# Patient Record
Sex: FEMALE | Race: WHITE | NOT HISPANIC OR LATINO | Employment: FULL TIME | URBAN - METROPOLITAN AREA
[De-identification: names, ages, dates, MRNs, and addresses within clinical notes are randomized per-mention and may not be internally consistent; named-entity substitution may affect disease eponyms.]

---

## 2017-07-09 PROBLEM — N13.30 HYDRONEPHROSIS: Status: ACTIVE | Noted: 2017-07-09

## 2017-07-09 PROBLEM — N12 PYELONEPHRITIS: Status: ACTIVE | Noted: 2017-07-09

## 2017-07-10 PROBLEM — Q62.39 UPJ OBSTRUCTION, CONGENITAL: Status: ACTIVE | Noted: 2017-07-10

## 2022-03-30 ENCOUNTER — TELEPHONE (OUTPATIENT)
Dept: UROLOGY | Facility: CLINIC | Age: 37
End: 2022-03-30
Payer: MEDICAID

## 2022-03-30 NOTE — TELEPHONE ENCOUNTER
----- Message from Jacquelin Coughlin MD sent at 3/30/2022 12:43 PM CDT -----  Of course, will have my staff make her an apt    Thanks,  Jacquelin  ----- Message -----  From: Ousmane Noyola MD  Sent: 3/30/2022  11:56 AM CDT  To: MD Dr. Madyson Jeff,    This patient has a upj obstruction on the right side. Presented with her second Dietl's crisis with possible ascending UTI. She required an urgent stent earlier this month. [place]. I cannot take her insurance outpatient non emergently. Can you please eval her in [place] for possible Pyeloplasty.     Imaging including Retrograde pyelograms are in Epic.       Feel free to call with in questions.    Ousmane Noyola  [phone] cell.

## 2022-03-30 NOTE — TELEPHONE ENCOUNTER
Called and spoke with patient, informed we received the referral and was calling to make an appt at the [facility]. The soonest appt I could make was 5/31. Informed message will be given to provider to to be seen sooner, will contact her back on Monday, patient verbally understood.

## 2022-04-04 ENCOUNTER — TELEPHONE (OUTPATIENT)
Dept: UROLOGY | Facility: CLINIC | Age: 37
End: 2022-04-04
Payer: MEDICAID

## 2022-04-04 NOTE — TELEPHONE ENCOUNTER
Called and spoke with patient, informed the provider is has moved the appt to sooner date of 4/19, patient accepted, appt made, patient verbally understood.

## 2022-04-04 NOTE — TELEPHONE ENCOUNTER
----- Message from Ju Ramsey sent at 4/4/2022  9:06 AM CDT -----  Regarding: Call Back  Who Called: pt          What is the reason for the call: pt is calling in regards to an appointment she has 5/31. Stated she was told may be able to be seen earlier. Please contact to further assist.          Can patient be contacted on [facility]: No         Call back number: [phone]

## 2022-04-05 ENCOUNTER — TELEPHONE (OUTPATIENT)
Dept: UROLOGY | Facility: CLINIC | Age: 37
End: 2022-04-05
Payer: MEDICAID

## 2022-04-05 NOTE — TELEPHONE ENCOUNTER
Returned call and spoke with patient, she states she is having some pain from the stent and thinks she may have UTI. Has new patient  appt to see provider at [facility], she can only be seen there. Provider unable to evaluate  without seeing patient. Advised to go to local ER for faster evaluation. Patient states she does not want to go there, she does not like their care, request to be place on wait list, which was already done. Patient verbally understood.

## 2022-04-05 NOTE — TELEPHONE ENCOUNTER
----- Message from Johanna Clayton, Patient Care Assistant sent at 4/5/2022  9:36 AM CDT -----  Regarding: advice  Contact: pt  Type: Needs Medical Advice  Who Called:  pt   Symptoms (please be specific):  UTI   How long has patient had these symptoms:  a week  Pharmacy name and phone #:    Best Call Back Number: [phone] (home)     Additional Information: please call pt to advise. Thanks!

## 2022-04-13 ENCOUNTER — TELEPHONE (OUTPATIENT)
Dept: UROLOGY | Facility: CLINIC | Age: 37
End: 2022-04-13
Payer: MEDICAID

## 2022-04-13 NOTE — TELEPHONE ENCOUNTER
Yes I do. However, we will have to discuss at her apt.   Only complicating factor is her [place] med[place] however, I am told that should not be a problem.

## 2022-04-13 NOTE — TELEPHONE ENCOUNTER
Spoke with patient, informed the provider does use the robot and they can discuss at the appt, patient verbally understood.

## 2022-04-13 NOTE — TELEPHONE ENCOUNTER
----- Message from Lance Zimmer sent at 4/13/2022  9:36 AM CDT -----  Regarding: medical questions  Contact: patient  Patient want to speak with a nurse regarding some medical questions, please call back at [phone]

## 2022-04-13 NOTE — TELEPHONE ENCOUNTER
Returned call and spoke with patient, she knows she has upcoming appt, but really wants to know does the provider do the pyelonephritis surgery using the robot.(having to do with recovery time ?) Informed will give message to provider and call her back with answer, patient verbally understood.

## 2022-04-18 NOTE — H&P (VIEW-ONLY)
[facility] Note    PCP: Primary Doctor No    Chief Complaint: right UPJ obstruction    SUBJECTIVE:       History of Present Illness:  Karie Rice is a 36 y.o. female who presents to clinic for right UPJ obstruction. She is New  to our clinic.     Patient presents to discuss right UPJo. She recently had a stent placed by Dr. Noyola for uncontrolled pain.   On review of her CT scan, she has severe right hydronephrosis with severe thinning of the right renal parenchyma.   She did have a MAG3 renal scan done in 2017 which showed 45% function of right kidney and no evidence of obstruction.     Had a stent 5 years ago which was removed and no further intervention after that as her MAG 3 was normal.     States she has had continued pain that is not relieved by the stent placement.   No issues with voiding.     UA today: small blood, trace leuks     Last urine culture: E coli (7/9/17)  Last creatinine: 1.15 (3/9/22)    Past medical, family, and social history reviewed as documented in chart with pertinent positive medical, family, and social history detailed in HPI.    Review of patient's allergies indicates:   Allergen Reactions    Codeine     Opioids - morphine analogues     Zithromax [azithromycin]        Past Medical History:   Diagnosis Date    Kidney stones     UPJ obstruction, acquired      Past Surgical History:   Procedure Laterality Date    CYSTOSCOPY      CYSTOURETEROSCOPY WITH RETROGRADE PYELOGRAPHY AND INSERTION OF STENT INTO URETER Right 03/10/2022    Procedure: CYSTOURETEROSCOPY, WITH RETROGRADE PYELOGRAM AND URETERAL STENT INSERTION;  Surgeon: Ousmane Noyola MD;  Location: [facility];  Service: Urology;  Laterality: Right;    LITHOTRIPSY       History reviewed. No pertinent family history.  Social History     Tobacco Use    Smoking status: Current Some Day Smoker    Smokeless tobacco: Never Used   Substance Use Topics    Alcohol use: No    Drug use: No        Review of Systems  "  Constitutional: Negative for fever.   Gastrointestinal: Positive for abdominal pain.   Genitourinary: Positive for flank pain. Negative for difficulty urinating, dysuria, frequency, hematuria, nocturia and urgency.       OBJECTIVE:     Anticoagulation: no    Estimated body mass index is 18.89 kg/m² as calculated from the following:    Height as of this encounter: 5' 7" (1.702 m).    Weight as of this encounter: 54.7 kg (120 lb 9.6 oz).    Vital Signs (Most Recent)  Pulse: 74 (04/19/22 0913)  Resp: 18 (04/19/22 0913)  BP: 109/72 (04/19/22 0913)    Physical Exam  Vitals reviewed.   Constitutional:       General: She is not in acute distress.     Appearance: Normal appearance. She is not ill-appearing.   HENT:      Head: Normocephalic and atraumatic.   Eyes:      General: No scleral icterus.  Cardiovascular:      Rate and Rhythm: Normal rate and regular rhythm.   Pulmonary:      Effort: Pulmonary effort is normal. No respiratory distress.   Abdominal:      Palpations: Abdomen is soft.   Skin:     Coloration: Skin is not jaundiced.   Neurological:      General: No focal deficit present.      Mental Status: She is alert and oriented to person, place, and time.   Psychiatric:         Mood and Affect: Mood normal.         Behavior: Behavior normal.         BMP  Lab Results   Component Value Date     03/09/2022    K 4.4 03/09/2022     03/09/2022    CO2 23 03/09/2022    BUN 15 03/09/2022    CREATININE 1.15 03/09/2022    CALCIUM 8.6 03/09/2022    ANIONGAP 9 03/09/2022    ESTGFRAFRICA >60 03/09/2022    EGFRNONAA >60 03/09/2022       Lab Results   Component Value Date    WBC 8.18 03/09/2022    HGB 11.8 (L) 03/09/2022    HCT 35.5 (L) 03/09/2022    MCV 86 03/09/2022     03/09/2022       Imaging:  Per HPI    ASSESSMENT     1. UPJ obstruction, acquired      PLAN:     - We reviewed her CT scan in detail   - Right kidney is severely atrophic and blow out. I suspect that it is not functional.   - Will repeat MAG 3 " scan and if confirms that kidney has <20 function we discussed need for a nephrectomy. If it does have some retained function we can go ahead with pyeloplasty  - Will also repeat CTRSS to see if kidney has decompressed with the stent in place   - She is anxious to have some thing done as she is in severe discomfort and has been dealing with this for a while. She states that she would favor nephrectomy as she does not want to have to deal with this anymore   - Will call her with results of scans   - Booked for robotic right nephrectomy on 5/5, can change to pyeloplasty if needed   - The patient is scheduled for a right nephrectomy, possible open. The risks and benefits of nephrectomy were discussed with the patient in detail. The risks include but are not limited to bleeding possibly requiring a blood transfusion, infection, pain, injury to internal organs such as lung, liver, spleen, bowel, adrenal, incomplete removal of tumor if present. Risks may vary depending on reasons for removal of kidney, persistent flank pain or hernia at area of incision, urinary infection requiring antibiotics, air embolus, pulmonary embolus and possible conversion to an open surgery. Patient understands these risks and has agreed to proceed with surgery.   - Urine today sent for culture       Jacquelin Coughlin MD     Letter to Ousmane Noyola MD

## 2022-04-18 NOTE — PROGRESS NOTES
[facility] Note    PCP: Primary Doctor No    Chief Complaint: right UPJ obstruction    SUBJECTIVE:       History of Present Illness:  Karie Rice is a 36 y.o. female who presents to clinic for right UPJ obstruction. She is New  to our clinic.     Patient presents to discuss right UPJo. She recently had a stent placed by Dr. Noyola for uncontrolled pain.   On review of her CT scan, she has severe right hydronephrosis with severe thinning of the right renal parenchyma.   She did have a MAG3 renal scan done in 2017 which showed 45% function of right kidney and no evidence of obstruction.     Had a stent 5 years ago which was removed and no further intervention after that as her MAG 3 was normal.     States she has had continued pain that is not relieved by the stent placement.   No issues with voiding.     UA today: small blood, trace leuks     Last urine culture: E coli (7/9/17)  Last creatinine: 1.15 (3/9/22)    Past medical, family, and social history reviewed as documented in chart with pertinent positive medical, family, and social history detailed in HPI.    Review of patient's allergies indicates:   Allergen Reactions    Codeine     Opioids - morphine analogues     Zithromax [azithromycin]        Past Medical History:   Diagnosis Date    Kidney stones     UPJ obstruction, acquired      Past Surgical History:   Procedure Laterality Date    CYSTOSCOPY      CYSTOURETEROSCOPY WITH RETROGRADE PYELOGRAPHY AND INSERTION OF STENT INTO URETER Right 03/10/2022    Procedure: CYSTOURETEROSCOPY, WITH RETROGRADE PYELOGRAM AND URETERAL STENT INSERTION;  Surgeon: Ousmane Noyola MD;  Location: [facility];  Service: Urology;  Laterality: Right;    LITHOTRIPSY       History reviewed. No pertinent family history.  Social History     Tobacco Use    Smoking status: Current Some Day Smoker    Smokeless tobacco: Never Used   Substance Use Topics    Alcohol use: No    Drug use: No        Review of Systems  "  Constitutional: Negative for fever.   Gastrointestinal: Positive for abdominal pain.   Genitourinary: Positive for flank pain. Negative for difficulty urinating, dysuria, frequency, hematuria, nocturia and urgency.       OBJECTIVE:     Anticoagulation: no    Estimated body mass index is 18.89 kg/m² as calculated from the following:    Height as of this encounter: 5' 7" (1.702 m).    Weight as of this encounter: 54.7 kg (120 lb 9.6 oz).    Vital Signs (Most Recent)  Pulse: 74 (04/19/22 0913)  Resp: 18 (04/19/22 0913)  BP: 109/72 (04/19/22 0913)    Physical Exam  Vitals reviewed.   Constitutional:       General: She is not in acute distress.     Appearance: Normal appearance. She is not ill-appearing.   HENT:      Head: Normocephalic and atraumatic.   Eyes:      General: No scleral icterus.  Cardiovascular:      Rate and Rhythm: Normal rate and regular rhythm.   Pulmonary:      Effort: Pulmonary effort is normal. No respiratory distress.   Abdominal:      Palpations: Abdomen is soft.   Skin:     Coloration: Skin is not jaundiced.   Neurological:      General: No focal deficit present.      Mental Status: She is alert and oriented to person, place, and time.   Psychiatric:         Mood and Affect: Mood normal.         Behavior: Behavior normal.         BMP  Lab Results   Component Value Date     03/09/2022    K 4.4 03/09/2022     03/09/2022    CO2 23 03/09/2022    BUN 15 03/09/2022    CREATININE 1.15 03/09/2022    CALCIUM 8.6 03/09/2022    ANIONGAP 9 03/09/2022    ESTGFRAFRICA >60 03/09/2022    EGFRNONAA >60 03/09/2022       Lab Results   Component Value Date    WBC 8.18 03/09/2022    HGB 11.8 (L) 03/09/2022    HCT 35.5 (L) 03/09/2022    MCV 86 03/09/2022     03/09/2022       Imaging:  Per HPI    ASSESSMENT     1. UPJ obstruction, acquired      PLAN:     - We reviewed her CT scan in detail   - Right kidney is severely atrophic and blow out. I suspect that it is not functional.   - Will repeat MAG 3 " scan and if confirms that kidney has <20 function we discussed need for a nephrectomy. If it does have some retained function we can go ahead with pyeloplasty  - Will also repeat CTRSS to see if kidney has decompressed with the stent in place   - She is anxious to have some thing done as she is in severe discomfort and has been dealing with this for a while. She states that she would favor nephrectomy as she does not want to have to deal with this anymore   - Will call her with results of scans   - Booked for robotic right nephrectomy on 5/5, can change to pyeloplasty if needed   - The patient is scheduled for a right nephrectomy, possible open. The risks and benefits of nephrectomy were discussed with the patient in detail. The risks include but are not limited to bleeding possibly requiring a blood transfusion, infection, pain, injury to internal organs such as lung, liver, spleen, bowel, adrenal, incomplete removal of tumor if present. Risks may vary depending on reasons for removal of kidney, persistent flank pain or hernia at area of incision, urinary infection requiring antibiotics, air embolus, pulmonary embolus and possible conversion to an open surgery. Patient understands these risks and has agreed to proceed with surgery.   - Urine today sent for culture       Jacquelin Coughlin MD     Letter to Ousmane Noyola MD

## 2022-04-19 ENCOUNTER — TELEPHONE (OUTPATIENT)
Dept: UROLOGY | Facility: CLINIC | Age: 37
End: 2022-04-19
Payer: MEDICAID

## 2022-04-19 ENCOUNTER — OFFICE VISIT (OUTPATIENT)
Dept: UROLOGY | Facility: CLINIC | Age: 37
End: 2022-04-19
Payer: MEDICAID

## 2022-04-19 VITALS
HEART RATE: 74 BPM | SYSTOLIC BLOOD PRESSURE: 109 MMHG | RESPIRATION RATE: 18 BRPM | HEIGHT: 67 IN | DIASTOLIC BLOOD PRESSURE: 72 MMHG | WEIGHT: 120.63 LBS | BODY MASS INDEX: 18.93 KG/M2

## 2022-04-19 DIAGNOSIS — N13.5 UPJ OBSTRUCTION, ACQUIRED: Primary | ICD-10-CM

## 2022-04-19 LAB
BILIRUB SERPL-MCNC: ABNORMAL MG/DL
BLOOD URINE, POC: ABNORMAL
CLARITY, POC UA: CLEAR
COLOR, POC UA: YELLOW
GLUCOSE UR QL STRIP: ABNORMAL
KETONES UR QL STRIP: ABNORMAL
LEUKOCYTE ESTERASE URINE, POC: ABNORMAL
NITRITE, POC UA: ABNORMAL
PH, POC UA: 6
PROTEIN, POC: ABNORMAL
SPECIFIC GRAVITY, POC UA: 1.01
UROBILINOGEN, POC UA: 0.2

## 2022-04-19 PROCEDURE — 99999 PR PBB SHADOW E&M-EST. PATIENT-LVL IV: CPT | Mod: PBBFAC,,, | Performed by: STUDENT IN AN ORGANIZED HEALTH CARE EDUCATION/TRAINING PROGRAM

## 2022-04-19 PROCEDURE — 3074F SYST BP LT 130 MM HG: CPT | Mod: CPTII,,, | Performed by: STUDENT IN AN ORGANIZED HEALTH CARE EDUCATION/TRAINING PROGRAM

## 2022-04-19 PROCEDURE — 87086 URINE CULTURE/COLONY COUNT: CPT | Performed by: STUDENT IN AN ORGANIZED HEALTH CARE EDUCATION/TRAINING PROGRAM

## 2022-04-19 PROCEDURE — 3008F PR BODY MASS INDEX (BMI) DOCUMENTED: ICD-10-PCS | Mod: CPTII,,, | Performed by: STUDENT IN AN ORGANIZED HEALTH CARE EDUCATION/TRAINING PROGRAM

## 2022-04-19 PROCEDURE — 99204 PR OFFICE/OUTPT VISIT, NEW, LEVL IV, 45-59 MIN: ICD-10-PCS | Mod: S$PBB,,, | Performed by: STUDENT IN AN ORGANIZED HEALTH CARE EDUCATION/TRAINING PROGRAM

## 2022-04-19 PROCEDURE — 3074F PR MOST RECENT SYSTOLIC BLOOD PRESSURE < 130 MM HG: ICD-10-PCS | Mod: CPTII,,, | Performed by: STUDENT IN AN ORGANIZED HEALTH CARE EDUCATION/TRAINING PROGRAM

## 2022-04-19 PROCEDURE — 81002 URINALYSIS NONAUTO W/O SCOPE: CPT | Mod: PBBFAC | Performed by: STUDENT IN AN ORGANIZED HEALTH CARE EDUCATION/TRAINING PROGRAM

## 2022-04-19 PROCEDURE — 3078F DIAST BP <80 MM HG: CPT | Mod: CPTII,,, | Performed by: STUDENT IN AN ORGANIZED HEALTH CARE EDUCATION/TRAINING PROGRAM

## 2022-04-19 PROCEDURE — 1159F PR MEDICATION LIST DOCUMENTED IN MEDICAL RECORD: ICD-10-PCS | Mod: CPTII,,, | Performed by: STUDENT IN AN ORGANIZED HEALTH CARE EDUCATION/TRAINING PROGRAM

## 2022-04-19 PROCEDURE — 99999 PR PBB SHADOW E&M-EST. PATIENT-LVL IV: ICD-10-PCS | Mod: PBBFAC,,, | Performed by: STUDENT IN AN ORGANIZED HEALTH CARE EDUCATION/TRAINING PROGRAM

## 2022-04-19 PROCEDURE — 3008F BODY MASS INDEX DOCD: CPT | Mod: CPTII,,, | Performed by: STUDENT IN AN ORGANIZED HEALTH CARE EDUCATION/TRAINING PROGRAM

## 2022-04-19 PROCEDURE — 99214 OFFICE O/P EST MOD 30 MIN: CPT | Mod: PBBFAC | Performed by: STUDENT IN AN ORGANIZED HEALTH CARE EDUCATION/TRAINING PROGRAM

## 2022-04-19 PROCEDURE — 1159F MED LIST DOCD IN RCRD: CPT | Mod: CPTII,,, | Performed by: STUDENT IN AN ORGANIZED HEALTH CARE EDUCATION/TRAINING PROGRAM

## 2022-04-19 PROCEDURE — 3078F PR MOST RECENT DIASTOLIC BLOOD PRESSURE < 80 MM HG: ICD-10-PCS | Mod: CPTII,,, | Performed by: STUDENT IN AN ORGANIZED HEALTH CARE EDUCATION/TRAINING PROGRAM

## 2022-04-19 PROCEDURE — 99204 OFFICE O/P NEW MOD 45 MIN: CPT | Mod: S$PBB,,, | Performed by: STUDENT IN AN ORGANIZED HEALTH CARE EDUCATION/TRAINING PROGRAM

## 2022-04-19 NOTE — TELEPHONE ENCOUNTER
----- Message from Gage Diehl MA sent at 4/19/2022 10:45 AM CDT -----  Contact: EDNA LEWIS [[id number]]  Type: Needs Medical Advice    Who Called:EDNA LEWIS [[id number]]  Best Call Back Number: [phone]  Inquiry/Question: Would you kindly call EDNA LEWIS [[id number]] regarding upcoming appt  Thank you~

## 2022-04-19 NOTE — TELEPHONE ENCOUNTER
Returned call and spoke with patient, she request to speak with Nikki, informed message will be given to her to call her back, patient verbally understood.

## 2022-04-20 LAB — BACTERIA UR CULT: NO GROWTH

## 2022-04-26 ENCOUNTER — HOSPITAL ENCOUNTER (OUTPATIENT)
Dept: RADIOLOGY | Facility: HOSPITAL | Age: 37
Discharge: HOME | End: 2022-04-26
Attending: STUDENT IN AN ORGANIZED HEALTH CARE EDUCATION/TRAINING PROGRAM
Payer: MEDICAID

## 2022-04-26 ENCOUNTER — TELEPHONE (OUTPATIENT)
Dept: UROLOGY | Facility: CLINIC | Age: 37
End: 2022-04-26
Payer: MEDICAID

## 2022-04-26 VITALS — SYSTOLIC BLOOD PRESSURE: 117 MMHG | DIASTOLIC BLOOD PRESSURE: 71 MMHG

## 2022-04-26 DIAGNOSIS — N13.5 UPJ OBSTRUCTION, ACQUIRED: Primary | ICD-10-CM

## 2022-04-26 DIAGNOSIS — N13.5 UPJ OBSTRUCTION, ACQUIRED: ICD-10-CM

## 2022-04-26 PROCEDURE — 74176 CT ABD & PELVIS W/O CONTRAST: CPT | Mod: 26,,, | Performed by: RADIOLOGY

## 2022-04-26 PROCEDURE — 78708 K FLOW/FUNCT IMAGE W/DRUG: CPT | Mod: TC

## 2022-04-26 PROCEDURE — 78708 K FLOW/FUNCT IMAGE W/DRUG: CPT | Mod: 26,,, | Performed by: RADIOLOGY

## 2022-04-26 PROCEDURE — 78708 NM KIDNEY W FLOW AND FUNCTION W PHARMACOLOGICAL: ICD-10-PCS | Mod: 26,,, | Performed by: RADIOLOGY

## 2022-04-26 PROCEDURE — 63600175 PHARM REV CODE 636 W HCPCS

## 2022-04-26 PROCEDURE — 74176 CT ABD & PELVIS W/O CONTRAST: CPT | Mod: TC

## 2022-04-26 PROCEDURE — 74176 CT RENAL STONE STUDY ABD PELVIS WO: ICD-10-PCS | Mod: 26,,, | Performed by: RADIOLOGY

## 2022-04-26 RX ORDER — FUROSEMIDE 10 MG/ML
INJECTION INTRAMUSCULAR; INTRAVENOUS
Status: COMPLETED
Start: 2022-04-26 | End: 2022-04-26

## 2022-04-26 RX ADMIN — FUROSEMIDE: 10 INJECTION, SOLUTION INTRAMUSCULAR; INTRAVENOUS at 01:04

## 2022-04-26 NOTE — TELEPHONE ENCOUNTER
----- Message from Jacquelin Coughlin MD sent at 4/26/2022  3:23 PM CDT -----  Results discussed with patient. 16.5% function of right kidney. Resolution of hydro with stent in place. Recommended we try to preserve kidney function with pyeloplasty. Risks and benefits discussed. She has agreed to proceed. Urine culture today. Scheduled for surgery 5/5.

## 2022-04-26 NOTE — TELEPHONE ENCOUNTER
Spoke with patient, inquired about covid vaccine, was indeed vaccinated, dates given, Moderna given and the booster. Unable to edit the 2nd and booster for moderna.

## 2022-05-02 RX ORDER — BUSPIRONE HYDROCHLORIDE 5 MG/1
5 TABLET ORAL DAILY
COMMUNITY
End: 2024-01-17

## 2022-05-02 RX ORDER — FLUOXETINE HYDROCHLORIDE 20 MG/1
20 CAPSULE ORAL DAILY
COMMUNITY
End: 2024-01-17

## 2022-05-04 ENCOUNTER — PATIENT MESSAGE (OUTPATIENT)
Dept: SURGERY | Facility: HOSPITAL | Age: 37
End: 2022-05-04
Payer: MEDICAID

## 2022-05-04 ENCOUNTER — ANESTHESIA EVENT (OUTPATIENT)
Dept: SURGERY | Facility: HOSPITAL | Age: 37
End: 2022-05-04
Payer: MEDICAID

## 2022-05-05 ENCOUNTER — ANESTHESIA (OUTPATIENT)
Dept: SURGERY | Facility: HOSPITAL | Age: 37
End: 2022-05-05
Payer: MEDICAID

## 2022-05-05 ENCOUNTER — HOSPITAL ENCOUNTER (INPATIENT)
Facility: HOSPITAL | Age: 37
LOS: 1 days | Discharge: HOME | End: 2022-05-06
Attending: STUDENT IN AN ORGANIZED HEALTH CARE EDUCATION/TRAINING PROGRAM | Admitting: STUDENT IN AN ORGANIZED HEALTH CARE EDUCATION/TRAINING PROGRAM
Payer: MEDICAID

## 2022-05-05 DIAGNOSIS — Q62.39 UPJ OBSTRUCTION, CONGENITAL: Primary | ICD-10-CM

## 2022-05-05 DIAGNOSIS — N13.5 OBSTRUCTION OF RIGHT URETEROPELVIC JUNCTION (UPJ): ICD-10-CM

## 2022-05-05 LAB
ABO + RH BLD: NORMAL
B-HCG UR QL: NEGATIVE
BLD GP AB SCN CELLS X3 SERPL QL: NORMAL
CTP QC/QA: YES

## 2022-05-05 PROCEDURE — 94799 UNLISTED PULMONARY SVC/PX: CPT

## 2022-05-05 PROCEDURE — 25000003 PHARM REV CODE 250: Performed by: ANESTHESIOLOGY

## 2022-05-05 PROCEDURE — 99900103 DSU ONLY-NO CHARGE-INITIAL HR (STAT): Performed by: STUDENT IN AN ORGANIZED HEALTH CARE EDUCATION/TRAINING PROGRAM

## 2022-05-05 PROCEDURE — 25000003 PHARM REV CODE 250: Performed by: STUDENT IN AN ORGANIZED HEALTH CARE EDUCATION/TRAINING PROGRAM

## 2022-05-05 PROCEDURE — 25000003 PHARM REV CODE 250: Performed by: NURSE ANESTHETIST, CERTIFIED REGISTERED

## 2022-05-05 PROCEDURE — 52332 CYSTOSCOPY AND TREATMENT: CPT | Mod: 51,RT,, | Performed by: STUDENT IN AN ORGANIZED HEALTH CARE EDUCATION/TRAINING PROGRAM

## 2022-05-05 PROCEDURE — 12000002 HC ACUTE/MED SURGE SEMI-PRIVATE ROOM

## 2022-05-05 PROCEDURE — C2617 STENT, NON-COR, TEM W/O DEL: HCPCS | Performed by: STUDENT IN AN ORGANIZED HEALTH CARE EDUCATION/TRAINING PROGRAM

## 2022-05-05 PROCEDURE — 37000008 HC ANESTHESIA 1ST 15 MINUTES: Performed by: STUDENT IN AN ORGANIZED HEALTH CARE EDUCATION/TRAINING PROGRAM

## 2022-05-05 PROCEDURE — 36000713 HC OR TIME LEV V EA ADD 15 MIN: Performed by: STUDENT IN AN ORGANIZED HEALTH CARE EDUCATION/TRAINING PROGRAM

## 2022-05-05 PROCEDURE — 52332 PR CYSTOSCOPY,INSERT URETERAL STENT: ICD-10-PCS | Mod: 51,RT,, | Performed by: STUDENT IN AN ORGANIZED HEALTH CARE EDUCATION/TRAINING PROGRAM

## 2022-05-05 PROCEDURE — D9220A PRA ANESTHESIA: ICD-10-PCS | Mod: ANES,,, | Performed by: ANESTHESIOLOGY

## 2022-05-05 PROCEDURE — 37000009 HC ANESTHESIA EA ADD 15 MINS: Performed by: STUDENT IN AN ORGANIZED HEALTH CARE EDUCATION/TRAINING PROGRAM

## 2022-05-05 PROCEDURE — 71000033 HC RECOVERY, INTIAL HOUR: Performed by: STUDENT IN AN ORGANIZED HEALTH CARE EDUCATION/TRAINING PROGRAM

## 2022-05-05 PROCEDURE — 86901 BLOOD TYPING SEROLOGIC RH(D): CPT | Performed by: STUDENT IN AN ORGANIZED HEALTH CARE EDUCATION/TRAINING PROGRAM

## 2022-05-05 PROCEDURE — C1729 CATH, DRAINAGE: HCPCS | Performed by: STUDENT IN AN ORGANIZED HEALTH CARE EDUCATION/TRAINING PROGRAM

## 2022-05-05 PROCEDURE — 94760 N-INVAS EAR/PLS OXIMETRY 1: CPT

## 2022-05-05 PROCEDURE — 63600175 PHARM REV CODE 636 W HCPCS: Performed by: NURSE ANESTHETIST, CERTIFIED REGISTERED

## 2022-05-05 PROCEDURE — 71000039 HC RECOVERY, EACH ADD'L HOUR: Performed by: STUDENT IN AN ORGANIZED HEALTH CARE EDUCATION/TRAINING PROGRAM

## 2022-05-05 PROCEDURE — 63600175 PHARM REV CODE 636 W HCPCS: Performed by: ANESTHESIOLOGY

## 2022-05-05 PROCEDURE — 81025 URINE PREGNANCY TEST: CPT | Performed by: ANESTHESIOLOGY

## 2022-05-05 PROCEDURE — 50544 PR LAP,PYELOPLASTY: ICD-10-PCS | Mod: RT,,, | Performed by: STUDENT IN AN ORGANIZED HEALTH CARE EDUCATION/TRAINING PROGRAM

## 2022-05-05 PROCEDURE — 50544 LAPAROSCOPY PYELOPLASTY: CPT | Mod: RT,,, | Performed by: STUDENT IN AN ORGANIZED HEALTH CARE EDUCATION/TRAINING PROGRAM

## 2022-05-05 PROCEDURE — D9220A PRA ANESTHESIA: Mod: ANES,,, | Performed by: ANESTHESIOLOGY

## 2022-05-05 PROCEDURE — 99900104 DSU ONLY-NO CHARGE-EA ADD'L HR (STAT): Performed by: STUDENT IN AN ORGANIZED HEALTH CARE EDUCATION/TRAINING PROGRAM

## 2022-05-05 PROCEDURE — C1769 GUIDE WIRE: HCPCS | Performed by: STUDENT IN AN ORGANIZED HEALTH CARE EDUCATION/TRAINING PROGRAM

## 2022-05-05 PROCEDURE — 36000712 HC OR TIME LEV V 1ST 15 MIN: Performed by: STUDENT IN AN ORGANIZED HEALTH CARE EDUCATION/TRAINING PROGRAM

## 2022-05-05 PROCEDURE — 63600175 PHARM REV CODE 636 W HCPCS: Performed by: STUDENT IN AN ORGANIZED HEALTH CARE EDUCATION/TRAINING PROGRAM

## 2022-05-05 PROCEDURE — 27201423 OPTIME MED/SURG SUP & DEVICES STERILE SUPPLY: Performed by: STUDENT IN AN ORGANIZED HEALTH CARE EDUCATION/TRAINING PROGRAM

## 2022-05-05 PROCEDURE — 36415 COLL VENOUS BLD VENIPUNCTURE: CPT | Performed by: STUDENT IN AN ORGANIZED HEALTH CARE EDUCATION/TRAINING PROGRAM

## 2022-05-05 PROCEDURE — D9220A PRA ANESTHESIA: Mod: CRNA,,, | Performed by: NURSE ANESTHETIST, CERTIFIED REGISTERED

## 2022-05-05 PROCEDURE — D9220A PRA ANESTHESIA: ICD-10-PCS | Mod: CRNA,,, | Performed by: NURSE ANESTHETIST, CERTIFIED REGISTERED

## 2022-05-05 DEVICE — STENT SET URETERAL 6X26CM: Type: IMPLANTABLE DEVICE | Site: URETER | Status: FUNCTIONAL

## 2022-05-05 RX ORDER — BUPIVACAINE HYDROCHLORIDE 5 MG/ML
INJECTION, SOLUTION EPIDURAL; INTRACAUDAL
Status: DISCONTINUED | OUTPATIENT
Start: 2022-05-05 | End: 2022-05-05

## 2022-05-05 RX ORDER — OXYCODONE HYDROCHLORIDE 5 MG/1
5 TABLET ORAL EVERY 4 HOURS PRN
Status: DISCONTINUED | OUTPATIENT
Start: 2022-05-05 | End: 2022-05-06 | Stop reason: HOSPADM

## 2022-05-05 RX ORDER — NEOSTIGMINE METHYLSULFATE 1 MG/ML
INJECTION, SOLUTION INTRAVENOUS
Status: DISCONTINUED | OUTPATIENT
Start: 2022-05-05 | End: 2022-05-05

## 2022-05-05 RX ORDER — ACETAMINOPHEN 10 MG/ML
INJECTION, SOLUTION INTRAVENOUS
Status: DISCONTINUED | OUTPATIENT
Start: 2022-05-05 | End: 2022-05-05

## 2022-05-05 RX ORDER — ONDANSETRON HYDROCHLORIDE 2 MG/ML
INJECTION, SOLUTION INTRAMUSCULAR; INTRAVENOUS
Status: DISCONTINUED | OUTPATIENT
Start: 2022-05-05 | End: 2022-05-05

## 2022-05-05 RX ORDER — PROPOFOL 10 MG/ML
VIAL (ML) INTRAVENOUS
Status: DISCONTINUED | OUTPATIENT
Start: 2022-05-05 | End: 2022-05-05

## 2022-05-05 RX ORDER — PHENYLEPHRINE HYDROCHLORIDE 10 MG/ML
INJECTION INTRAVENOUS
Status: DISCONTINUED | OUTPATIENT
Start: 2022-05-05 | End: 2022-05-05

## 2022-05-05 RX ORDER — POLYETHYLENE GLYCOL 3350 17 G/17G
17 POWDER, FOR SOLUTION ORAL DAILY
Status: DISCONTINUED | OUTPATIENT
Start: 2022-05-05 | End: 2022-05-06 | Stop reason: HOSPADM

## 2022-05-05 RX ORDER — SODIUM CHLORIDE 9 MG/ML
INJECTION, SOLUTION INTRAVENOUS CONTINUOUS
Status: DISCONTINUED | OUTPATIENT
Start: 2022-05-05 | End: 2022-05-06 | Stop reason: HOSPADM

## 2022-05-05 RX ORDER — ACETAMINOPHEN 500 MG
1000 TABLET ORAL EVERY 8 HOURS
Status: DISCONTINUED | OUTPATIENT
Start: 2022-05-05 | End: 2022-05-06 | Stop reason: HOSPADM

## 2022-05-05 RX ORDER — METOCLOPRAMIDE HYDROCHLORIDE 5 MG/ML
10 INJECTION INTRAMUSCULAR; INTRAVENOUS EVERY 10 MIN PRN
Status: DISCONTINUED | OUTPATIENT
Start: 2022-05-05 | End: 2022-05-05 | Stop reason: HOSPADM

## 2022-05-05 RX ORDER — MUPIROCIN 20 MG/G
OINTMENT TOPICAL 2 TIMES DAILY
Status: DISCONTINUED | OUTPATIENT
Start: 2022-05-05 | End: 2022-05-06 | Stop reason: HOSPADM

## 2022-05-05 RX ORDER — SODIUM CHLORIDE 0.9 % (FLUSH) 0.9 %
10 SYRINGE (ML) INJECTION
Status: DISCONTINUED | OUTPATIENT
Start: 2022-05-05 | End: 2022-05-06 | Stop reason: HOSPADM

## 2022-05-05 RX ORDER — MIDAZOLAM HYDROCHLORIDE 1 MG/ML
2 INJECTION, SOLUTION INTRAMUSCULAR; INTRAVENOUS ONCE
Status: COMPLETED | OUTPATIENT
Start: 2022-05-05 | End: 2022-05-05

## 2022-05-05 RX ORDER — MIDAZOLAM HYDROCHLORIDE 1 MG/ML
INJECTION INTRAMUSCULAR; INTRAVENOUS
Status: DISCONTINUED | OUTPATIENT
Start: 2022-05-05 | End: 2022-05-05

## 2022-05-05 RX ORDER — HYDROMORPHONE HYDROCHLORIDE 2 MG/ML
0.2 INJECTION, SOLUTION INTRAMUSCULAR; INTRAVENOUS; SUBCUTANEOUS EVERY 5 MIN PRN
Status: DISCONTINUED | OUTPATIENT
Start: 2022-05-05 | End: 2022-05-05 | Stop reason: HOSPADM

## 2022-05-05 RX ORDER — LIDOCAINE HCL/PF 100 MG/5ML
SYRINGE (ML) INTRAVENOUS
Status: DISCONTINUED | OUTPATIENT
Start: 2022-05-05 | End: 2022-05-05

## 2022-05-05 RX ORDER — CEFAZOLIN SODIUM 2 G/50ML
2 SOLUTION INTRAVENOUS
Status: COMPLETED | OUTPATIENT
Start: 2022-05-05 | End: 2022-05-05

## 2022-05-05 RX ORDER — FENTANYL CITRATE 50 UG/ML
25 INJECTION, SOLUTION INTRAMUSCULAR; INTRAVENOUS EVERY 5 MIN PRN
Status: DISCONTINUED | OUTPATIENT
Start: 2022-05-05 | End: 2022-05-05 | Stop reason: HOSPADM

## 2022-05-05 RX ORDER — PROMETHAZINE HYDROCHLORIDE 12.5 MG/1
12.5 TABLET ORAL EVERY 6 HOURS PRN
Status: DISCONTINUED | OUTPATIENT
Start: 2022-05-05 | End: 2022-05-06 | Stop reason: HOSPADM

## 2022-05-05 RX ORDER — FLUOXETINE HYDROCHLORIDE 20 MG/1
20 CAPSULE ORAL DAILY
Status: DISCONTINUED | OUTPATIENT
Start: 2022-05-05 | End: 2022-05-06 | Stop reason: HOSPADM

## 2022-05-05 RX ORDER — FENTANYL CITRATE 50 UG/ML
INJECTION, SOLUTION INTRAMUSCULAR; INTRAVENOUS
Status: DISCONTINUED | OUTPATIENT
Start: 2022-05-05 | End: 2022-05-05

## 2022-05-05 RX ORDER — DEXAMETHASONE SODIUM PHOSPHATE 4 MG/ML
INJECTION, SOLUTION INTRA-ARTICULAR; INTRALESIONAL; INTRAMUSCULAR; INTRAVENOUS; SOFT TISSUE
Status: DISCONTINUED | OUTPATIENT
Start: 2022-05-05 | End: 2022-05-05

## 2022-05-05 RX ORDER — BUSPIRONE HYDROCHLORIDE 5 MG/1
5 TABLET ORAL DAILY
Status: DISCONTINUED | OUTPATIENT
Start: 2022-05-05 | End: 2022-05-06 | Stop reason: HOSPADM

## 2022-05-05 RX ORDER — PANTOPRAZOLE SODIUM 40 MG/1
40 TABLET, DELAYED RELEASE ORAL DAILY
Status: DISCONTINUED | OUTPATIENT
Start: 2022-05-05 | End: 2022-05-06 | Stop reason: HOSPADM

## 2022-05-05 RX ORDER — ONDANSETRON 8 MG/1
8 TABLET, ORALLY DISINTEGRATING ORAL EVERY 6 HOURS PRN
Status: DISCONTINUED | OUTPATIENT
Start: 2022-05-05 | End: 2022-05-06 | Stop reason: HOSPADM

## 2022-05-05 RX ORDER — OXYCODONE HYDROCHLORIDE 5 MG/1
5 TABLET ORAL
Status: DISCONTINUED | OUTPATIENT
Start: 2022-05-05 | End: 2022-05-05 | Stop reason: HOSPADM

## 2022-05-05 RX ORDER — OXYCODONE HYDROCHLORIDE 10 MG/1
10 TABLET ORAL EVERY 4 HOURS PRN
Status: DISCONTINUED | OUTPATIENT
Start: 2022-05-05 | End: 2022-05-06 | Stop reason: HOSPADM

## 2022-05-05 RX ORDER — LIDOCAINE HYDROCHLORIDE 10 MG/ML
1 INJECTION, SOLUTION EPIDURAL; INFILTRATION; INTRACAUDAL; PERINEURAL ONCE
Status: DISCONTINUED | OUTPATIENT
Start: 2022-05-05 | End: 2022-05-05

## 2022-05-05 RX ORDER — ROCURONIUM BROMIDE 10 MG/ML
INJECTION, SOLUTION INTRAVENOUS
Status: DISCONTINUED | OUTPATIENT
Start: 2022-05-05 | End: 2022-05-05

## 2022-05-05 RX ADMIN — DEXAMETHASONE SODIUM PHOSPHATE 4 MG: 4 INJECTION, SOLUTION INTRA-ARTICULAR; INTRALESIONAL; INTRAMUSCULAR; INTRAVENOUS; SOFT TISSUE at 10:05

## 2022-05-05 RX ADMIN — NEOSTIGMINE METHYLSULFATE 3 MG: 1 INJECTION INTRAVENOUS at 01:05

## 2022-05-05 RX ADMIN — LIDOCAINE HYDROCHLORIDE 50 MG: 20 INJECTION INTRAVENOUS at 10:05

## 2022-05-05 RX ADMIN — SODIUM CHLORIDE, SODIUM GLUCONATE, SODIUM ACETATE, POTASSIUM CHLORIDE, MAGNESIUM CHLORIDE, SODIUM PHOSPHATE, DIBASIC, AND POTASSIUM PHOSPHATE: .53; .5; .37; .037; .03; .012; .00082 INJECTION, SOLUTION INTRAVENOUS at 07:05

## 2022-05-05 RX ADMIN — ROCURONIUM BROMIDE 10 MG: 10 INJECTION, SOLUTION INTRAVENOUS at 12:05

## 2022-05-05 RX ADMIN — SODIUM CHLORIDE, SODIUM GLUCONATE, SODIUM ACETATE, POTASSIUM CHLORIDE, MAGNESIUM CHLORIDE, SODIUM PHOSPHATE, DIBASIC, AND POTASSIUM PHOSPHATE: .53; .5; .37; .037; .03; .012; .00082 INJECTION, SOLUTION INTRAVENOUS at 11:05

## 2022-05-05 RX ADMIN — HYDROMORPHONE HYDROCHLORIDE 0.2 MG: 2 INJECTION INTRAMUSCULAR; INTRAVENOUS; SUBCUTANEOUS at 03:05

## 2022-05-05 RX ADMIN — ROCURONIUM BROMIDE 40 MG: 10 INJECTION, SOLUTION INTRAVENOUS at 10:05

## 2022-05-05 RX ADMIN — PHENYLEPHRINE HYDROCHLORIDE 0.61 MCG/KG/MIN: 10 INJECTION INTRAVENOUS at 10:05

## 2022-05-05 RX ADMIN — ONDANSETRON 4 MG: 2 INJECTION, SOLUTION INTRAMUSCULAR; INTRAVENOUS at 10:05

## 2022-05-05 RX ADMIN — SODIUM CHLORIDE: 0.9 INJECTION, SOLUTION INTRAVENOUS at 03:05

## 2022-05-05 RX ADMIN — FENTANYL CITRATE 50 MCG: 50 INJECTION, SOLUTION INTRAMUSCULAR; INTRAVENOUS at 10:05

## 2022-05-05 RX ADMIN — ONDANSETRON 8 MG: 8 TABLET, ORALLY DISINTEGRATING ORAL at 04:05

## 2022-05-05 RX ADMIN — MIDAZOLAM HYDROCHLORIDE 2 MG: 1 INJECTION, SOLUTION INTRAMUSCULAR; INTRAVENOUS at 10:05

## 2022-05-05 RX ADMIN — PHENYLEPHRINE HYDROCHLORIDE 200 MCG: 10 INJECTION INTRAVENOUS at 01:05

## 2022-05-05 RX ADMIN — PHENYLEPHRINE HYDROCHLORIDE 100 MCG: 10 INJECTION INTRAVENOUS at 11:05

## 2022-05-05 RX ADMIN — ONDANSETRON 4 MG: 2 INJECTION, SOLUTION INTRAMUSCULAR; INTRAVENOUS at 01:05

## 2022-05-05 RX ADMIN — OXYCODONE 5 MG: 5 TABLET ORAL at 04:05

## 2022-05-05 RX ADMIN — PHENYLEPHRINE HYDROCHLORIDE 200 MCG: 10 INJECTION INTRAVENOUS at 10:05

## 2022-05-05 RX ADMIN — MIDAZOLAM 1 MG: 1 INJECTION INTRAMUSCULAR; INTRAVENOUS at 08:05

## 2022-05-05 RX ADMIN — ROCURONIUM BROMIDE 10 MG: 10 INJECTION, SOLUTION INTRAVENOUS at 11:05

## 2022-05-05 RX ADMIN — MUPIROCIN: 20 OINTMENT TOPICAL at 09:05

## 2022-05-05 RX ADMIN — GLYCOPYRROLATE 0.4 MG: 0.2 INJECTION, SOLUTION INTRAMUSCULAR; INTRAVITREAL at 01:05

## 2022-05-05 RX ADMIN — ACETAMINOPHEN 1000 MG: 10 INJECTION, SOLUTION INTRAVENOUS at 12:05

## 2022-05-05 RX ADMIN — BUSPIRONE HYDROCHLORIDE 5 MG: 5 TABLET ORAL at 06:05

## 2022-05-05 RX ADMIN — CEFAZOLIN SODIUM 2 G: 2 SOLUTION INTRAVENOUS at 10:05

## 2022-05-05 RX ADMIN — PROPOFOL 150 MG: 10 INJECTION, EMULSION INTRAVENOUS at 10:05

## 2022-05-05 RX ADMIN — SODIUM CHLORIDE, SODIUM GLUCONATE, SODIUM ACETATE, POTASSIUM CHLORIDE, MAGNESIUM CHLORIDE, SODIUM PHOSPHATE, DIBASIC, AND POTASSIUM PHOSPHATE: .53; .5; .37; .037; .03; .012; .00082 INJECTION, SOLUTION INTRAVENOUS at 01:05

## 2022-05-05 RX ADMIN — FENTANYL CITRATE 50 MCG: 50 INJECTION, SOLUTION INTRAMUSCULAR; INTRAVENOUS at 01:05

## 2022-05-05 RX ADMIN — GLYCOPYRROLATE 0.2 MG: 0.2 INJECTION, SOLUTION INTRAMUSCULAR; INTRAVITREAL at 10:05

## 2022-05-05 RX ADMIN — ACETAMINOPHEN 1000 MG: 500 TABLET ORAL at 09:05

## 2022-05-05 NOTE — INTERVAL H&P NOTE
The patient has been examined and the H&P has been reviewed:    I concur with the findings and no changes have occurred since H&P was written.    Surgery risks, benefits and alternative options discussed and understood by patient/family.    MAG 3 renal scan showed 16.5% function of right kidney.   CT showed go decompression with resolution of hydronephrosis.   Recommended we go ahead with right pyeloplasty.   I have explained the indication, risks, benefits, and alternatives of the procedure in detail. I discussed the rationale for the procedure and the typical management and expectations. I discussed the risks associated with the procedure. We discussed alternative management options. She voiced understanding following the discussion. She was given the opportunity to ask questions and all these questions were all answered to her satisfaction. She has elected to proceed as planned. Consent was obtained.     There are no hospital problems to display for this patient.

## 2022-05-05 NOTE — PLAN OF CARE
Patient prepared for procedure.  No questions at this time.  Family at bedside.  Belongings placed with .

## 2022-05-05 NOTE — OP NOTE
[facility]  Operative Note     Date: 05/05/2022      Pre-Op Diagnosis:   1. Right ureteropelvic junction obstructioton  2. Right hydronephrosis  3. Decreased right renal function    Patient Active Problem List   Diagnosis    Pyelonephritis    Hydronephrosis    UPJ obstruction, congenital     Past Medical History:   Diagnosis Date    Kidney stones     UPJ obstruction, acquired        Post-Op Diagnosis: same     Procedure(s) Performed:   1. Robotic-assisted laparoscopic right dismembered pyeloplasty  2. Ureterotomy for placement of right ureteral stent  3. Right ureteral stent placement     Specimen(s): None     Staff Surgeon: Jacquelin Coughlin MD     Assistant Surgeon: CAMELIA Cid     Anesthesia: General endotracheal anesthesia     Indications: Karie Rice is a 36 y.o. female with a history of right UPJ obstruction. Patient has the following imaging: CT demonstrates severe right hydro; MAG 3 renal scan demonstrates 16.5% function of right kidney; and retrograde pyelogram demonstrates narrowing at the UPJ. She does have a stent. All options were discussed with the patient, who elected to proceed with pyeloplasty.     Findings:   1. UPJ obstruction was identified with lower pole crossing vessel consistent with pre-operative work-up.  2. Robotic-assisted laparoscopic right dismembered pyeloplasty was performed. The anastomosis was transposed anterior to the crossing vessel. Anastomosis appeared water-tight and tension-free.  3. 6 F x 26 cm JJ right ureteral stent was placed in antegrade fashion.     Estimated Blood Loss: 50 cc     Drains:   1.  16 Fr che catheter  2.  6 F x 26 cm right JJ ureteral stent without strings  3.  10 mm femi drain     Procedure in Detail: After discussion of risks and benefits of the procedure, informed consent was obtained. Subcutaneous heparin was administered in pre-operative area. The patient was brought to the operating room and placed supine on the operating  table.  SCDs were applied and working prior to induction of anesthesia.  General anesthesia was administered.  A 16 Fr Watts catheter was placed in the standard fashion with 10 ml sterile water used to inflate the balloon.  An OG tube was placed. The patient was then moved into the flank position with the right side up. The patient was appropriately padded and secured to the table. The patient was then prepped and draped in the usual sterile fashion. Timeout was performed and preoperative antibiotics were confirmed.       A Veress needle was introduced into the abdomen.  Entry into the peritoneal cavity was confirmed with the drop test and an initial insufflation pressure of <10mmHg.  Aspiration during our drop test revealed no blood or succus.  The peritoneal cavity was insufflated up to 15 mmHg and the Veress was removed. The location of the camera port was marked with a marking pen and incised sharply using a 15 blade.  The 8 mm robotic camera port was then introduced.  The camera was passed through the port and the abdomen was inspected and found to be free of bowel or vascular injury.  Using direct vision the other 3 robotic ports were placed in a line down the left abdomen, ensuring at least 8 cm between ports to allow robotic mobility.  A 12 mm assistant port was placed lateral to the umbilicus.  Each trocar was introduced under direct vision ensuring no injury to the underlying abdominal contents.       Dissection began along the white line of Toldt, reflecting the colon medially away from the kidney up to the hepatic flexure.  The psoas muscle was identified. Once the colon was reflected, dissection was carried out just below the kidney, and the ureter was identified. The proximal renal pelvis was identified. The ureter was elevated and we continued our dissection toward the lower pole of the kidney and all the way up to the renal pelvis. The lower pole crossing vessel was identified, crossing at the UPJ at  the location of the stenosis. Care was taken to preserve the lower pole vessels. Care was taken to atraumatically manipulate the ureter and to maintain ureteral blood supply and adventitia. There was narrowing at the UPJ at the location of the crossing vessel consistent with UPJ obstruction. The renal pelvis was mobilized, as well as the kidney.      We then divided the UPJ proximal to the narrowing. The ureter was spatulated laterally. The renal pelvis and ureter were transposed anterior to the lower pole vessels. The anastomosis was then performed using two 3-0 monocryl sutures, running one on the posterior wall and one on the anterior wall. Prior to completion of the anterior wall closure, a 6 F x 26 cm JJ ureteral stent without strings was passed through the abdominal wall via a glidewire through an angiocatheter under direct vision. The proximal coil was placed within the renal pelvis. The anastomosis was then completed. The anastomosis appeared tension-free and water-tight. Gerota's was closed with a running 0 vicryl.      Pneumoperitoneum pressure was dropped and the surgical field was inspected. Hemostasis was confirmed. A 10 mm femi drain was placed through the lower quadrant trocar.     The robot was undocked and all trocars were removed. The 12 mm port was closed with the crossbow.     All skin incisions were closed using 4-0 monocryl. Dermabond was applied to the incisions.      A correct needle and sponge count was confirmed.     The patient tolerated the procedure well and was transferred to the recovery room in stable condition.     Disposition: The patient will remain on the urology service overnight for observation. Remove che in am. KUB in recovery to confirm position of stent. Follow-up in 4-6 weeks for stent removal.

## 2022-05-05 NOTE — ANESTHESIA PROCEDURE NOTES
Intubation    Date/Time: 5/5/2022 10:20 AM  Performed by: Daren Champagne CRNA  Authorized by: Leonardo Martinez MD     Intubation:     Induction:  Intravenous    Intubated:  Postinduction    Mask Ventilation:  Easy mask    Attempts:  1    Attempted By:  CRNA    Method of Intubation:  Direct    Blade:  Baron 2    Laryngeal View Grade: Grade I - full view of cords      Difficult Airway Encountered?: No      Complications:  None    Airway Device:  Oral endotracheal tube    Airway Device Size:  7.0    Style/Cuff Inflation:  Cuffed (inflated to minimal occlusive pressure)    Tube secured:  20    Secured at:  The lips    Placement Verified By:  Capnometry    Complicating Factors:  None    Findings Post-Intubation:  BS equal bilateral and atraumatic/condition of teeth unchanged

## 2022-05-05 NOTE — TRANSFER OF CARE
"Anesthesia Transfer of Care Note    Patient: Karie Wall    Procedure(s) Performed: Procedure(s) (LRB):  ROBOTIC PYELOPLASTY (Right)    Patient location: PACU    Anesthesia Type: general    Transport from OR: Transported from OR on 2-3 L/min O2 by NC with adequate spontaneous ventilation    Post pain: adequate analgesia    Post assessment: no apparent anesthetic complications and tolerated procedure well    Post vital signs: stable    Level of consciousness: sedated    Nausea/Vomiting: no nausea/vomiting    Complications: none    Transfer of care protocol was followed      Last vitals:   Visit Vitals  BP 98/64 (BP Location: Left arm, Patient Position: Lying)   Pulse 67   Temp 36.8 °C (98.2 °F) (Skin)   Resp 18   Ht 5' 7" (1.702 m)   Wt 54.4 kg (120 lb)   SpO2 99%   Breastfeeding No   BMI 18.79 kg/m²     "

## 2022-05-05 NOTE — ANESTHESIA PREPROCEDURE EVALUATION
05/05/2022  Karie Rice is a 36 y.o., female.      Pre-op Assessment    I have reviewed the Patient Summary Reports.     I have reviewed the Nursing Notes. I have reviewed the NPO Status.   I have reviewed the Medications.     Review of Systems  Anesthesia Hx:  No problems with previous Anesthesia    Social:  Smoker    Hematology/Oncology:  Hematology Normal   Oncology Normal     EENT/Dental:EENT/Dental Normal   Cardiovascular:  Cardiovascular Normal     Pulmonary:  Pulmonary Normal    Renal/:   Chronic Renal Disease renal calculi UPJ obstruction    Hepatic/GI:  Hepatic/GI Normal    Musculoskeletal:  Musculoskeletal Normal    Neurological:  Neurology Normal    Endocrine:  Endocrine Normal    Dermatological:  Skin Normal    Psych:  Psychiatric Normal           Physical Exam  General: Well nourished, Cooperative, Alert and Oriented    Airway:  Mallampati: II   Mouth Opening: Normal  TM Distance: Normal  Neck ROM: Normal ROM    Dental:  Intact        Anesthesia Plan  Type of Anesthesia, risks & benefits discussed:    Anesthesia Type: Gen ETT  Intra-op Monitoring Plan: Standard ASA Monitors  Post Op Pain Control Plan: multimodal analgesia and IV/PO Opioids PRN  Induction:  IV  Airway Plan: Direct, Post-Induction  Informed Consent: Informed consent signed with the Patient and all parties understand the risks and agree with anesthesia plan.  All questions answered.   ASA Score: 2  Day of Surgery Review of History & Physical: H&P Update referred to the surgeon/provider.    Ready For Surgery From Anesthesia Perspective.     .

## 2022-05-05 NOTE — PLAN OF CARE
AAOx3. NAD. VSS. Calm and cooperative. Speech appropriate. Tolerating clear liquids. Denies nausea. NSR. Resp E/U. BBS clear.100% on RA. 20G IV to Rt FA   infusing with dressing CDI. No swelling or redness. Incisions x6 to abdomen CDI. ERIC drain CDI and draining. Watts cath intact and draining taras colored urine. Family notified of patient status. All safety measures taken. Bed in low position. Bedrails up x2. Bed locked.

## 2022-05-05 NOTE — ANESTHESIA POSTPROCEDURE EVALUATION
Anesthesia Post Evaluation    Patient: Karie Wall    Procedure(s) Performed: Procedure(s) (LRB):  ROBOTIC PYELOPLASTY (Right)    Final Anesthesia Type: general      Patient location during evaluation: PACU  Patient participation: Yes- Able to Participate  Level of consciousness: awake and alert  Post-procedure vital signs: reviewed and stable  Pain management: adequate  Airway patency: patent    PONV status at discharge: No PONV  Anesthetic complications: no      Cardiovascular status: hemodynamically stable  Respiratory status: unassisted and room air  Hydration status: euvolemic  Follow-up not needed.          Vitals Value Taken Time   BP 90/51 05/05/22 1606   Temp 37.2 °C (99 °F) 05/05/22 1606   Pulse 86 05/05/22 1606   Resp 12 05/05/22 1606   SpO2 100 % 05/05/22 1606         Event Time   Out of Recovery 15:56:39         Pain/Suhail Score: Pain Rating Prior to Med Admin: 7 (5/5/2022  3:30 PM)  Pain Rating Post Med Admin: 5 (5/5/2022  3:43 PM)  Suhail Score: 9 (5/5/2022  3:45 PM)

## 2022-05-06 VITALS
WEIGHT: 120 LBS | HEIGHT: 67 IN | DIASTOLIC BLOOD PRESSURE: 67 MMHG | HEART RATE: 57 BPM | BODY MASS INDEX: 18.83 KG/M2 | TEMPERATURE: 99 F | OXYGEN SATURATION: 100 % | RESPIRATION RATE: 16 BRPM | SYSTOLIC BLOOD PRESSURE: 106 MMHG

## 2022-05-06 LAB
ANION GAP SERPL CALC-SCNC: 7 MMOL/L (ref 8–16)
BASOPHILS # BLD AUTO: 0.01 K/UL (ref 0–0.2)
BASOPHILS NFR BLD: 0.1 % (ref 0–1.9)
BODY FLUID SOURCE, CREATININE: NORMAL
BUN SERPL-MCNC: 11 MG/DL (ref 6–20)
CALCIUM SERPL-MCNC: 7.4 MG/DL (ref 8.7–10.5)
CHLORIDE SERPL-SCNC: 110 MMOL/L (ref 95–110)
CO2 SERPL-SCNC: 21 MMOL/L (ref 23–29)
CREAT FLD-MCNC: 1 MG/DL
CREAT SERPL-MCNC: 1.2 MG/DL (ref 0.5–1.4)
DIFFERENTIAL METHOD: ABNORMAL
EOSINOPHIL # BLD AUTO: 0 K/UL (ref 0–0.5)
EOSINOPHIL NFR BLD: 0 % (ref 0–8)
ERYTHROCYTE [DISTWIDTH] IN BLOOD BY AUTOMATED COUNT: 12.8 % (ref 11.5–14.5)
EST. GFR  (AFRICAN AMERICAN): >60 ML/MIN/1.73 M^2
EST. GFR  (NON AFRICAN AMERICAN): 58 ML/MIN/1.73 M^2
GLUCOSE SERPL-MCNC: 90 MG/DL (ref 70–110)
HCT VFR BLD AUTO: 31.5 % (ref 37–48.5)
HGB BLD-MCNC: 10.5 G/DL (ref 12–16)
IMM GRANULOCYTES # BLD AUTO: 0.01 K/UL (ref 0–0.04)
IMM GRANULOCYTES NFR BLD AUTO: 0.1 % (ref 0–0.5)
LYMPHOCYTES # BLD AUTO: 1.1 K/UL (ref 1–4.8)
LYMPHOCYTES NFR BLD: 13.3 % (ref 18–48)
MCH RBC QN AUTO: 28.4 PG (ref 27–31)
MCHC RBC AUTO-ENTMCNC: 33.3 G/DL (ref 32–36)
MCV RBC AUTO: 85 FL (ref 82–98)
MONOCYTES # BLD AUTO: 0.5 K/UL (ref 0.3–1)
MONOCYTES NFR BLD: 5.9 % (ref 4–15)
NEUTROPHILS # BLD AUTO: 6.5 K/UL (ref 1.8–7.7)
NEUTROPHILS NFR BLD: 80.6 % (ref 38–73)
NRBC BLD-RTO: 0 /100 WBC
PLATELET # BLD AUTO: 158 K/UL (ref 150–450)
PMV BLD AUTO: 10.9 FL (ref 9.2–12.9)
POTASSIUM SERPL-SCNC: 4.5 MMOL/L (ref 3.5–5.1)
RBC # BLD AUTO: 3.7 M/UL (ref 4–5.4)
SODIUM SERPL-SCNC: 138 MMOL/L (ref 136–145)
WBC # BLD AUTO: 8.1 K/UL (ref 3.9–12.7)

## 2022-05-06 PROCEDURE — 36415 COLL VENOUS BLD VENIPUNCTURE: CPT | Performed by: STUDENT IN AN ORGANIZED HEALTH CARE EDUCATION/TRAINING PROGRAM

## 2022-05-06 PROCEDURE — 25000003 PHARM REV CODE 250: Performed by: STUDENT IN AN ORGANIZED HEALTH CARE EDUCATION/TRAINING PROGRAM

## 2022-05-06 PROCEDURE — 80048 BASIC METABOLIC PNL TOTAL CA: CPT | Performed by: STUDENT IN AN ORGANIZED HEALTH CARE EDUCATION/TRAINING PROGRAM

## 2022-05-06 PROCEDURE — 85025 COMPLETE CBC W/AUTO DIFF WBC: CPT | Performed by: STUDENT IN AN ORGANIZED HEALTH CARE EDUCATION/TRAINING PROGRAM

## 2022-05-06 PROCEDURE — 94799 UNLISTED PULMONARY SVC/PX: CPT

## 2022-05-06 PROCEDURE — 82570 ASSAY OF URINE CREATININE: CPT | Performed by: STUDENT IN AN ORGANIZED HEALTH CARE EDUCATION/TRAINING PROGRAM

## 2022-05-06 PROCEDURE — 94761 N-INVAS EAR/PLS OXIMETRY MLT: CPT

## 2022-05-06 RX ORDER — POLYETHYLENE GLYCOL 3350 17 G/17G
17 POWDER, FOR SOLUTION ORAL DAILY
Qty: 15 EACH | Refills: 0 | Status: SHIPPED | OUTPATIENT
Start: 2022-05-06 | End: 2022-05-21

## 2022-05-06 RX ORDER — OXYCODONE AND ACETAMINOPHEN 5; 325 MG/1; MG/1
1 TABLET ORAL EVERY 4 HOURS PRN
Qty: 15 TABLET | Refills: 0 | Status: SHIPPED | OUTPATIENT
Start: 2022-05-06 | End: 2024-01-17

## 2022-05-06 RX ORDER — TAMSULOSIN HYDROCHLORIDE 0.4 MG/1
0.4 CAPSULE ORAL DAILY
Qty: 30 CAPSULE | Refills: 3 | Status: SHIPPED | OUTPATIENT
Start: 2022-05-06 | End: 2024-01-17

## 2022-05-06 RX ADMIN — POLYETHYLENE GLYCOL 3350 17 G: 17 POWDER, FOR SOLUTION ORAL at 10:05

## 2022-05-06 RX ADMIN — ACETAMINOPHEN 1000 MG: 500 TABLET ORAL at 05:05

## 2022-05-06 RX ADMIN — MUPIROCIN: 20 OINTMENT TOPICAL at 10:05

## 2022-05-06 RX ADMIN — PANTOPRAZOLE SODIUM 40 MG: 40 TABLET, DELAYED RELEASE ORAL at 10:05

## 2022-05-06 NOTE — PLAN OF CARE
The pt is cleared for discharge home from case management.    05/06/22 8905   Final Note   Assessment Type Final Discharge Note   Anticipated Discharge Disposition Home   Hospital Resources/Appts/Education Provided Appointments scheduled and added to AVS

## 2022-05-06 NOTE — NURSING
Patients blood pressure continues to run low, 86/54. Patient states this is her normal blood pressure. Noted BP has been running low since admit. Urine output adequate. Patient AAOX4. Family not concerned. Will continue to monitor.

## 2022-05-06 NOTE — PLAN OF CARE
Adult plan of care met. Patient & family were given discharge instructions, patient & family understood the instructions. PIV was removed per order/ protocol, catheter intact.

## 2022-05-06 NOTE — PLAN OF CARE
POC discussed with patient, verbalized understanding. Patient with uneventful night, slept well throughout the shift. Watts with clear yellow urine, adequate urine output. VS stable. IVF infusing. No complaints of pain. Abdominal incision lap sites CDI. ERIC with scant bloody drainage. Family at bedside. IS encouraged @ bs.

## 2022-05-06 NOTE — PLAN OF CARE
[facility]  Initial Discharge Assessment       Primary Care Provider: Primary Doctor No    Admission Diagnosis: UPJ obstruction, acquired [N13.5]  Obstruction of right ureteropelvic junction (UPJ) [N13.5]    Admission Date: 5/5/2022  Expected Discharge Date:      Assessment completed with patient and mother Theron Rice [phone] at bedside. She confirmed pts address and that she lives with her spouse Guanako Harper [phone]. She denied HH and DME. She is unsure of pts pcp and stated that the pts spouse will provide transport home.     Discharge Barriers Identified: None    Pay[place] / Plan[place] / Product Type: Government /     Extended Emergency Contact Information  Primary Emergency Contact: Theron Rice  [address]           [place], [place] [id number] [place] [address]  Home Phone: [phone]  Mobile Phone: [phone]  Relation: Mother  Secondary Emergency Contact: Guanako Harper  Mobile Phone: [phone]  Relation: Spouse  Preferred language: English   needed? No  Father: Justin Rice  [address]: [address]           [place], [place] [id number] [place] [address]    Discharge Plan A: Home with family  Discharge Plan B: Home      [id number] - [place] [place] - [address] AT [facility]  [address]  [facility] [address]  Phone: [phone] Fax: [phone]      Initial Assessment (most recent)     Adult Discharge Assessment - 05/06/22 1009        Discharge Assessment    Assessment Type Discharge Planning Assessment     Confirmed/corrected address, phone number and insurance Yes     Confirmed Demographics Correct on Facesheet     Source of Information patient;family     If unable to respond/provide information was family/caregiver contacted? Yes     Contact Name/Number Mom at bedside Theron Rice [phone]     Communicated ILYA with patient/caregiver Yes      Reason For Admission UPJ obstruction     Lives With spouse     Facility Arrived From: home     Do you expect to return to your current living situation? Yes     Do you have help at home or someone to help you manage your care at home? Yes     Who are your caregiver(s) and their phone number(s)? Guanako Harper [phone]     Prior to hospitilization cognitive status: Alert/Oriented     Current cognitive status: Coma/Sedated/Intubated     Walking or Climbing Stairs Difficulty none     Dressing/Bathing Difficulty none     Home Layout Able to live on 1st floor     Equipment Currently Used at Home none     Readmission within 30 days? No     Patient currently being followed by outpatient case management? No     Do you currently have service(s) that help you manage your care at home? No     Do you take prescription medications? Yes     Do you have prescription coverage? Yes     Do you have any problems affording any of your prescribed medications? No     Is the patient taking medications as prescribed? yes     Who is going to help you get home at discharge? Guanako Harper [phone]     How do you get to doctors appointments? car, drives self;family or friend will provide     Are you on dialysis? No     Do you take coumadin? No     Discharge Plan A Home with family     Discharge Plan B Home     DME Needed Upon Discharge  none     Discharge Plan discussed with: Patient;Parent(s)     Name(s) and Number(s) Theron Rice - Mother [phone]     Discharge Barriers Identified None        Relationship/Environment    Name(s) of Who Lives With Patient Guanako Meredith [phone]

## 2022-05-06 NOTE — CARE UPDATE
05/06/22 0749   Patient Assessment/Suction   Level of Consciousness (AVPU) alert   PRE-TX-O2   O2 Device (Oxygen Therapy) room air   SpO2: Pre-Ductal (Right Hand) 100 %   Pulse Oximetry Type Intermittent   $ Pulse Oximetry - Multiple Charge Pulse Oximetry - Multiple   Incentive Spirometer   $ Incentive Spirometer Charges done with encouragement   Administration (IS) instruction provided, follow-up;mouthpiece utilized   Number of Repetitions (IS) 10   Level Incentive Spirometer (mL) 2000   Patient Tolerance (IS) good

## 2022-05-06 NOTE — PLAN OF CARE
POC reviewed with pt, pt verbalizes understanding. Prn medication given for pain and nausea. IVF infusing. Watts intact and draining dark urine. ERIC drain intact and patent. Pt ambulated. Q2h rounding done, safety maintained. All needs attended to. Bed locked and low, call light in reach.

## 2022-05-06 NOTE — NURSING
Patient bladder scanned post void .  92 ML of urine in bladder. Patient okay to discharge per Dr. Coughlin.

## 2022-05-06 NOTE — NURSING
Watts catheter removed by MD. Patient up to void, no void. Patient felt urge to void, bladder scanned around 50cc. IVF infusing.

## 2022-05-06 NOTE — PROGRESS NOTES
Urology Progress Note      Karie Rice is a 36 y.o. female s/p right robotic pyeloplasty on 5/5/22.    AFVSS  No acute events overnight  Pain is controlled  Nausea last night which has resolved, no tolerating regular diet    Watts draining clear yellow  ERIC drain with SS output  abd soft, appropriately tender, non-distended, incisions c/d/i    UOP:200/300/1250  ERIC: NR/30/30/30    Lab Results   Component Value Date    WBC 8.10 05/06/2022    HGB 10.5 (L) 05/06/2022    HCT 31.5 (L) 05/06/2022    MCV 85 05/06/2022     05/06/2022          BMP  Lab Results   Component Value Date     05/06/2022    K 4.5 05/06/2022     05/06/2022    CO2 21 (L) 05/06/2022    BUN 11 05/06/2022    CREATININE 1.2 05/06/2022    CALCIUM 7.4 (L) 05/06/2022    ANIONGAP 7 (L) 05/06/2022    ESTGFRAFRICA >60 05/06/2022    EGFRNONAA 58 (A) 05/06/2022         Plan:  - Regular diet  - Watts removed this am  - ERIC Cr negative for leak, drain removed   - Ambulate/oob  - Home meds restarted  - Plan for discharge home today after voiding   - Stent removal 6/21 in [place]      Jacquelin Coughlin MD  Urology Department

## 2022-05-08 NOTE — DISCHARGE SUMMARY
[facility]  Urology  Discharge Summary      Patient Name: Karie Rice  MRN: [id number]  Admission Date: 5/5/2022  Hospital Length of Stay: 1 days  Discharge Date and Time: 5/6/2022  5:35 PM  Attending Physician: Jacquelin Coughlin MD  Discharging Provider: Jacquelin Coughlin MD  Primary Care Physician: Primary Doctor No    HPI: Karie Rice is a 36 y.o. female with right UPJ obstruction.     Procedure(s) (LRB):  ROBOTIC PYELOPLASTY (Right)     Indwelling Lines/Drains at time of discharge:   Lines/Drains/Airways     Drain  Duration                Ureteral Drain/Stent 05/05/22 1300 Right ureter 6 Fr. 2 days                Hospital Course (synopsis of major diagnoses, care, treatment, and services provided during the course of the hospital stay): Patient admitted following right robotic pyeloplasty. She tolerated the procedure well with no immediate complications. On POD 1 her che was removed. She was ambulating, pain was controlled and was tolerating a regular diet prior to discharge. Her ERIC drain was checked for creatinine and was negative for a leak and was removed.     Consults:     Significant Diagnostic Studies: see chart review    Pending Diagnostic Studies:     None          Final Active Diagnoses:    Diagnosis Date Noted POA    PRINCIPAL PROBLEM:  UPJ obstruction, congenital [Q62.39] 07/10/2017 Not Applicable      Problems Resolved During this Admission:       Discharged Condition: good    Disposition: Home or Self Care    Follow Up:   Follow-up Information     Jacquelin Coughlin MD Follow up on 6/21/2022.    Specialty: Urology  Why: stent removal  Contact information:  [address] [place], [place] [address] [phone]                       Patient Instructions:      Notify your health care provider if you experience any of the following:  temperature >100.4     Notify your health care provider if you experience any of the following:  persistent nausea and vomiting or diarrhea     Notify  your health care provider if you experience any of the following:  severe uncontrolled pain     Notify your health care provider if you experience any of the following:  redness, tenderness, or signs of infection (pain, swelling, redness, odor or green/yellow discharge around incision site)     No dressing needed     Activity as tolerated     Medications:  Reconciled Home Medications:      Medication List      START taking these medications    oxyCODONE-acetaminophen 5-325 mg per tablet  Commonly known as: PERCOCET  Take 1 tablet by mouth every 4 (four) hours as needed for Pain.     polyethylene glycol 17 gram Pwpk  Commonly known as: GLYCOLAX  Take 17 g by mouth once daily. for 15 days     tamsulosin 0.4 mg Cap  Commonly known as: FLOMAX  Take 1 capsule (0.4 mg total) by mouth once daily.        CONTINUE taking these medications    acetaminophen 325 MG tablet  Commonly known as: TYLENOL  Take 1 tablet (325 mg total) by mouth every 6 (six) hours as needed for Pain.     aspirin-acetaminophen-caffeine 250-250-65 mg 250-250-65 mg per tablet  Commonly known as: EXCEDRIN MIGRAINE  Take 1 tablet by mouth every 6 (six) hours as needed for Pain.     busPIRone 5 MG Tab  Commonly known as: BUSPAR  Take 5 mg by mouth once daily.     FLUoxetine 20 MG capsule  Take 20 mg by mouth once daily.     ketorolac 10 mg tablet  Commonly known as: TORADOL  Take 1 tablet (10 mg total) by mouth every 6 (six) hours.            Time spent on the discharge of patient: 20 minutes    Jacquelin Coughlin MD  Urology  [facility]

## 2022-06-06 ENCOUNTER — PATIENT MESSAGE (OUTPATIENT)
Dept: SURGERY | Facility: HOSPITAL | Age: 37
End: 2022-06-06
Payer: MEDICAID

## 2022-06-13 ENCOUNTER — PATIENT MESSAGE (OUTPATIENT)
Dept: SURGERY | Facility: HOSPITAL | Age: 37
End: 2022-06-13
Payer: MEDICAID

## 2022-06-21 ENCOUNTER — HOSPITAL ENCOUNTER (OUTPATIENT)
Facility: HOSPITAL | Age: 37
Discharge: HOME | End: 2022-06-21
Attending: STUDENT IN AN ORGANIZED HEALTH CARE EDUCATION/TRAINING PROGRAM | Admitting: STUDENT IN AN ORGANIZED HEALTH CARE EDUCATION/TRAINING PROGRAM
Payer: MEDICAID

## 2022-06-21 VITALS
RESPIRATION RATE: 14 BRPM | HEIGHT: 67 IN | BODY MASS INDEX: 18.82 KG/M2 | TEMPERATURE: 98 F | SYSTOLIC BLOOD PRESSURE: 105 MMHG | DIASTOLIC BLOOD PRESSURE: 69 MMHG | WEIGHT: 119.94 LBS | HEART RATE: 69 BPM | OXYGEN SATURATION: 100 %

## 2022-06-21 DIAGNOSIS — Q62.39 UPJ OBSTRUCTION, CONGENITAL: Primary | ICD-10-CM

## 2022-06-21 DIAGNOSIS — N13.5 OBSTRUCTION OF RIGHT URETEROPELVIC JUNCTION (UPJ): ICD-10-CM

## 2022-06-21 PROCEDURE — 36000706: Performed by: STUDENT IN AN ORGANIZED HEALTH CARE EDUCATION/TRAINING PROGRAM

## 2022-06-21 PROCEDURE — 52310 CYSTOSCOPY AND TREATMENT: CPT | Mod: 58,,, | Performed by: STUDENT IN AN ORGANIZED HEALTH CARE EDUCATION/TRAINING PROGRAM

## 2022-06-21 PROCEDURE — 25000003 PHARM REV CODE 250: Performed by: STUDENT IN AN ORGANIZED HEALTH CARE EDUCATION/TRAINING PROGRAM

## 2022-06-21 PROCEDURE — 52310 PR CYSTOSCOPY,REMV CALCULUS,SIMPLE: ICD-10-PCS | Mod: 58,,, | Performed by: STUDENT IN AN ORGANIZED HEALTH CARE EDUCATION/TRAINING PROGRAM

## 2022-06-21 RX ORDER — SULFAMETHOXAZOLE AND TRIMETHOPRIM 800; 160 MG/1; MG/1
1 TABLET ORAL 2 TIMES DAILY
Qty: 4 TABLET | Refills: 0 | Status: SHIPPED | OUTPATIENT
Start: 2022-06-21 | End: 2022-06-23

## 2022-06-21 RX ORDER — LIDOCAINE HYDROCHLORIDE 20 MG/ML
JELLY TOPICAL
Status: DISCONTINUED | OUTPATIENT
Start: 2022-06-21 | End: 2022-06-21 | Stop reason: HOSPADM

## 2022-06-21 NOTE — H&P
[facility]    CC: right UPJ    HPI:  Karie Rice is a 36 y.o. female with a right UPJ obstruction s/p right pyeloplasty on 5/5/22.      ROS:  Neg except per HPI    Past Medical History:   Diagnosis Date    Kidney stones     UPJ obstruction, acquired        Past Surgical History:   Procedure Laterality Date    CYSTOSCOPY      CYSTOURETEROSCOPY WITH RETROGRADE PYELOGRAPHY AND INSERTION OF STENT INTO URETER Right 03/10/2022    Procedure: CYSTOURETEROSCOPY, WITH RETROGRADE PYELOGRAM AND URETERAL STENT INSERTION;  Surgeon: Ousmane Noyola MD;  Location[facility];  Service: Urology;  Laterality: Right;    LITHOTRIPSY      ROBOT-ASSISTED LAPAROSCOPIC PYELOPLASTY Right 5/5/2022    Procedure: ROBOTIC PYELOPLASTY;  Surgeon: Jacquelin Coughlin MD;  Location: [facility] OR;  Service: Urology;  Laterality: Right;       Social History     Socioeconomic History    Marital status:    Tobacco Use    Smoking status: Current Some Day Smoker    Smokeless tobacco: Never Used   Substance and Sexual Activity    Alcohol use: No    Drug use: No       No family history on file.    Review of patient's allergies indicates:   Allergen Reactions    Codeine     Opioids - morphine analogues     Zithromax [azithromycin]        No current facility-administered medications on file prior to encounter.     Current Outpatient Medications on File Prior to Encounter   Medication Sig Dispense Refill    acetaminophen (TYLENOL) 325 MG tablet Take 1 tablet (325 mg total) by mouth every 6 (six) hours as needed for Pain.  0    aspirin-acetaminophen-caffeine 250-250-65 mg (EXCEDRIN MIGRAINE) 250-250-65 mg per tablet Take 1 tablet by mouth every 6 (six) hours as needed for Pain.      busPIRone (BUSPAR) 5 MG Tab Take 5 mg by mouth once daily.      FLUoxetine 20 MG capsule Take 20 mg by mouth once daily.      ketorolac (TORADOL) 10 mg tablet Take 1 tablet (10 mg total) by mouth every 6 (six) hours. 21 tablet 0    oxyCODONE-acetaminophen  "(PERCOCET) 5-325 mg per tablet Take 1 tablet by mouth every 4 (four) hours as needed for Pain. 15 tablet 0    tamsulosin (FLOMAX) 0.4 mg Cap Take 1 capsule (0.4 mg total) by mouth once daily. 30 capsule 3       Anticoagulation:  No    Physical Exam:  Estimated body mass index is 18.78 kg/m² as calculated from the following:    Height as of this encounter: 5' 7" (1.702 m).    Weight as of this encounter: 54.4 kg (119 lb 14.9 oz).    General: No acute distress, well developed. AAOx3  Head: Normocephalic, Atraumatic  Eyes: Extra-occular movements intact, No discharge  Neck: supple, symmetrical, trachea midline  Lungs: normal respiratory effort, no respiratory distress, no wheezes  CV: regular rate, 2+ pulses  Abdomen: soft, non-tender, non-distended, no organomegaly  MSK: no edema, no deformities, normal ROM  Skin: skin color, texture, turgor normal.  Neurologic: no focal deficits, sensation intact    Labs:    Lab Results   Component Value Date    WBC 8.10 05/06/2022    HGB 10.5 (L) 05/06/2022    HCT 31.5 (L) 05/06/2022    MCV 85 05/06/2022     05/06/2022           BMP  Lab Results   Component Value Date     05/06/2022    K 4.5 05/06/2022     05/06/2022    CO2 21 (L) 05/06/2022    BUN 11 05/06/2022    CREATININE 1.2 05/06/2022    CALCIUM 7.4 (L) 05/06/2022    ANIONGAP 7 (L) 05/06/2022    ESTGFRAFRICA >60 05/06/2022    EGFRNONAA 58 (A) 05/06/2022       Assessment: Karie Rice is a 36 y.o. female with right UPJ obstruction s/p pyeloplasty     Plan:     1. Stent removal today       Jacquelin Coughlin MD    "

## 2022-06-21 NOTE — DISCHARGE SUMMARY
[facility]  Discharge Note  Short Stay    Admit Date: 6/21/2022    Discharge Date and Time: 06/21/2022 12:52 PM      Attending Physician: Jacquelin Coughlin MD     Discharge Provider: Jacquelin Coughlin    Diagnoses:  Active Hospital Problems    Diagnosis  POA    *UPJ obstruction, congenital [Q62.39]  Not Applicable      Resolved Hospital Problems   No resolved problems to display.       Discharged Condition: good    Hospital Course: Patient was admitted for cysto stent removal and tolerated the procedure well with no complications. The patient was discharged home in good condition on the same day.       Final Diagnoses: Same as principal problem.    Disposition: Home or Self Care    Follow up/Patient Instructions:    Medications:  Reconciled Home Medications:   Current Discharge Medication List      START taking these medications    Details   sulfamethoxazole-trimethoprim 800-160mg (BACTRIM DS) 800-160 mg Tab Take 1 tablet by mouth 2 (two) times daily. for 2 days  Qty: 4 tablet, Refills: 0         CONTINUE these medications which have NOT CHANGED    Details   acetaminophen (TYLENOL) 325 MG tablet Take 1 tablet (325 mg total) by mouth every 6 (six) hours as needed for Pain.  Refills: 0      aspirin-acetaminophen-caffeine 250-250-65 mg (EXCEDRIN MIGRAINE) 250-250-65 mg per tablet Take 1 tablet by mouth every 6 (six) hours as needed for Pain.      busPIRone (BUSPAR) 5 MG Tab Take 5 mg by mouth once daily.      FLUoxetine 20 MG capsule Take 20 mg by mouth once daily.      ketorolac (TORADOL) 10 mg tablet Take 1 tablet (10 mg total) by mouth every 6 (six) hours.  Qty: 21 tablet, Refills: 0      oxyCODONE-acetaminophen (PERCOCET) 5-325 mg per tablet Take 1 tablet by mouth every 4 (four) hours as needed for Pain.  Qty: 15 tablet, Refills: 0    Comments: n/a       tamsulosin (FLOMAX) 0.4 mg Cap Take 1 capsule (0.4 mg total) by mouth once daily.  Qty: 30 capsule, Refills: 3           Discharge Procedure Orders    NM Renal Scan with LASIX   Standing Status: Future Standing Exp. Date: 06/21/23     Order Specific Question Answer Comments   May the Radiologist modify the order per protocol to meet the clinical needs of the patient? Yes      US Retroperitoneal Complete (Kidney and   Standing Status: Future Standing Exp. Date: 06/21/23     Order Specific Question Answer Comments   May the Radiologist modify the order per protocol to meet the clinical needs of the patient? Yes    Release to patient Immediate      Call MD for:  temperature >100.4     No dressing needed      Follow-up Information     Jacquelin Coughlin MD Follow up in 3 month(s).    Specialty: Urology  Why: with MAG 3 and US  Contact information:  [address] [place] [address] [address]IVE  [address]  [facility] [id number] [phone]                           Jacquelin Coughlin MD  Urology Department

## 2022-06-21 NOTE — PLAN OF CARE
1256 pt given discharge and follow up instructions.  Pt then discharged to private vehicle via wheelchair in stable condition.

## 2022-06-21 NOTE — OP NOTE
[facility]  Operative Note    Date: 06/21/2022    Pre-Op Diagnosis:   - Right UPJ obstruction     Post-Op Diagnosis: same    Procedure(s) Performed:   1.  Cystoscopy with right ureteral stent removal     Specimen(s): none    Staff Surgeon: Jacquelin Coughlin MD    Anesthesia: Local anesthesia topical 2% lidocaine gel    Indications: Karie Rice is a 36 y.o. female with a right UPJ obstruction s/p pyeloplasty. She presents today for stent removal.     Findings: uncomplicated right ureteral stent removal     Estimated Blood Loss: min    Procedure in Detail:  After risks, benefits and possible complications of cystoscopy were explained, the patient elected to undergo the procedure and informed consent was obtained. All questions were answered in the jaylen-operative area. The patient was transferred to the cystoscopy suite and placed in the dorsal lithotomy position and prepped and draped in the usual sterile fashion.  Time out was performed.     A flexible cystoscope was introduced into the bladder per urethra. This passed easily. The right sided stent was grasped and removed in its entirety without complication.     The patient tolerated the procedure well and was transferred to recovery in stable condition.    Disposition:  The patient will follow up in 3 months with a renal US and MAG 3 renal scan.      Jacquelin Coughlin MD

## 2022-09-26 ENCOUNTER — TELEPHONE (OUTPATIENT)
Dept: UROLOGY | Facility: CLINIC | Age: 37
End: 2022-09-26

## 2022-09-26 NOTE — TELEPHONE ENCOUNTER
----- Message from Raudel Thibodeaux sent at 9/26/2022 11:52 AM CDT -----  Contact: pt at [phone]  Type:  Sooner Appointment Request    Caller is requesting a sooner appointment.  Caller declined first available appointment listed below.  Caller will not accept being placed on the waitlist and is requesting a message be sent to doctor.    Name of Caller:  pt  When is the first available appointment?  N/a  Symptoms:  f/u appt  Best Call Back Number:  [phone]    Additional Information:  Please call back and advise.

## 2022-09-26 NOTE — TELEPHONE ENCOUNTER
Returned call and spoke with patient, she wanted to make follow up appt. Informed she needed to do imaging prior to appt but the chart states she has no insurance coverage. Patient states she is self pay. Informed its not listed on the chart, she will need to contact central billing to discuss and have placed on chart so appts can be scheduled, number given, patient verbally understood.

## 2022-09-26 NOTE — TELEPHONE ENCOUNTER
Informed patient call placed to [facility] radiology, they were on another line, they will call her back to schedule imaging, she will then call back to get appt scheduled with doctor appt after imaging, patient verbally understood.

## 2022-09-26 NOTE — TELEPHONE ENCOUNTER
----- Message from Krissy Santiago sent at 9/26/2022  1:28 PM CDT -----    Patient Returning Call        Who Called:pt  Who Left Message for Patient:pt  Does the patient know what this is regarding?:appt for self pay  Would the patient rather a call back or a response via gogamingoner? call  Best Call Back Number [phone]  Additional Information:callback

## 2022-10-10 ENCOUNTER — HOSPITAL ENCOUNTER (OUTPATIENT)
Dept: RADIOLOGY | Facility: HOSPITAL | Age: 37
Discharge: HOME | End: 2022-10-10
Attending: STUDENT IN AN ORGANIZED HEALTH CARE EDUCATION/TRAINING PROGRAM

## 2022-10-10 DIAGNOSIS — Q62.39 UPJ OBSTRUCTION, CONGENITAL: ICD-10-CM

## 2022-10-10 PROCEDURE — 78708 K FLOW/FUNCT IMAGE W/DRUG: CPT | Mod: TC

## 2022-10-10 PROCEDURE — A9562 TC99M MERTIATIDE: HCPCS

## 2022-10-10 PROCEDURE — 76770 US EXAM ABDO BACK WALL COMP: CPT | Mod: TC

## 2022-10-10 PROCEDURE — 63600175 PHARM REV CODE 636 W HCPCS: Performed by: STUDENT IN AN ORGANIZED HEALTH CARE EDUCATION/TRAINING PROGRAM

## 2022-10-10 RX ORDER — FUROSEMIDE 10 MG/ML
15 INJECTION INTRAMUSCULAR; INTRAVENOUS ONCE
Status: COMPLETED | OUTPATIENT
Start: 2022-10-10 | End: 2022-10-10

## 2022-10-10 RX ADMIN — FUROSEMIDE 15 MG: 10 INJECTION, SOLUTION INTRAMUSCULAR; INTRAVENOUS at 02:10

## 2022-11-01 NOTE — PROGRESS NOTES
[facility] Note    PCP: Primary Doctor No    Chief Complaint: right UPJ obstruction    SUBJECTIVE:       History of Present Illness:  Karie Rice is a 36 y.o. female who presents to clinic for right UPJ obstruction. She is Established  to our clinic.     S/p robotic right pyeloplasty on 5/5/22.  Renal US: chronic dilation of right kidney  MAG 3: 12% function of right kidney, unable to assess drainage    She does have occasional right sided back pain, improves with movement. Sometimes radiates to the front, but is relieved with a bowel movement. Only has a BM every 3-4 days.   Drinks lots of fluids throughout the day.   No voiding complaints.     4/19/22  Patient presents to discuss right UPJo. She recently had a stent placed by Dr. Noyola for uncontrolled pain.   On review of her CT scan, she has severe right hydronephrosis with severe thinning of the right renal parenchyma.   She did have a MAG3 renal scan done in 2017 which showed 45% function of right kidney and no evidence of obstruction.     Had a stent 5 years ago which was removed and no further intervention after that as her MAG 3 was normal.     States she has had continued pain that is not relieved by the stent placement.   No issues with voiding.     UA today: small blood, trace leuks     Last urine culture: E coli (7/9/17)  Last creatinine: 1.15 (3/9/22)    Past medical, family, and social history reviewed as documented in chart with pertinent positive medical, family, and social history detailed in HPI.    Review of patient's allergies indicates:   Allergen Reactions    Codeine     Opioids - morphine analogues     Zithromax [azithromycin]        Past Medical History:   Diagnosis Date    Kidney stones     UPJ obstruction, acquired      Past Surgical History:   Procedure Laterality Date    CYSTOSCOPY      CYSTOSCOPY W/ URETERAL STENT REMOVAL Right 6/21/2022    Procedure: CYSTOSCOPY, WITH URETERAL STENT REMOVAL;  Surgeon: Jacquelin DONG  "MD Madyson;  Location: [facility] OR;  Service: Urology;  Laterality: Right;    CYSTOURETEROSCOPY WITH RETROGRADE PYELOGRAPHY AND INSERTION OF STENT INTO URETER Right 03/10/2022    Procedure: CYSTOURETEROSCOPY, WITH RETROGRADE PYELOGRAM AND URETERAL STENT INSERTION;  Surgeon: Ousmane Noyola MD;  Location: [facility] OR;  Service: Urology;  Laterality: Right;    LITHOTRIPSY      ROBOT-ASSISTED LAPAROSCOPIC PYELOPLASTY Right 5/5/2022    Procedure: ROBOTIC PYELOPLASTY;  Surgeon: Jacquelin Coughlin MD;  Location: [facility] OR;  Service: Urology;  Laterality: Right;     No family history on file.  Social History     Tobacco Use    Smoking status: Some Days    Smokeless tobacco: Never   Substance Use Topics    Alcohol use: No    Drug use: No        Review of Systems   Gastrointestinal:  Positive for abdominal pain and constipation.   Genitourinary:  Positive for flank pain. Negative for difficulty urinating, dysuria, frequency, hematuria, nocturia and urgency.     OBJECTIVE:     Anticoagulation: no    Estimated body mass index is 20.59 kg/m² as calculated from the following:    Height as of this encounter: 5' 4" (1.626 m).    Weight as of 6/21/22: 54.4 kg (119 lb 14.9 oz).    Vital Signs (Most Recent)  Pulse: 73 (11/02/22 1359)  Resp: 18 (11/02/22 1359)  BP: 101/70 (11/02/22 1359)    Physical Exam  Vitals reviewed.   Constitutional:       General: She is not in acute distress.     Appearance: Normal appearance. She is not ill-appearing.   HENT:      Head: Normocephalic and atraumatic.   Eyes:      General: No scleral icterus.  Cardiovascular:      Rate and Rhythm: Normal rate and regular rhythm.   Pulmonary:      Effort: Pulmonary effort is normal. No respiratory distress.   Abdominal:      Palpations: Abdomen is soft.   Skin:     Coloration: Skin is not jaundiced.   Neurological:      General: No focal deficit present.      Mental Status: She is alert and oriented to person, place, and time.   Psychiatric:         Mood and Affect: Mood " normal.         Behavior: Behavior normal.       BMP  Lab Results   Component Value Date     05/06/2022    K 4.5 05/06/2022     05/06/2022    CO2 21 (L) 05/06/2022    BUN 11 05/06/2022    CREATININE 1.2 05/06/2022    CALCIUM 7.4 (L) 05/06/2022    ANIONGAP 7 (L) 05/06/2022    ESTGFRAFRICA >60 05/06/2022    EGFRNONAA 58 (A) 05/06/2022       Lab Results   Component Value Date    WBC 8.10 05/06/2022    HGB 10.5 (L) 05/06/2022    HCT 31.5 (L) 05/06/2022    MCV 85 05/06/2022     05/06/2022       Imaging:  Per HPI    ASSESSMENT     1. UPJ obstruction, congenital      PLAN:     - We reviewed her imaging. She does have continued dilation of the right collecting system which may be chronic. Unable to assess drainage on MAG 3.   - Overall her pain is improved from pre op and there may be some GI component  - Stool softener or miralax daily   - Discussed that if symptoms recur the next step would be to do a nephrectomy as she has minimal function of the right kidney   - However, if symptoms are fine then can be observed  - If pain worsens or becomes more bothersome she will let me know and we can consider stent placement to see if this relieves symptoms prior to nephrectomy  - If doing ok, follow up in 6 months  - Currently she is without insurance but hopes to get a job soon      Jacquelin Coughiln MD

## 2022-11-02 ENCOUNTER — OFFICE VISIT (OUTPATIENT)
Dept: UROLOGY | Facility: CLINIC | Age: 37
End: 2022-11-02

## 2022-11-02 VITALS
HEART RATE: 73 BPM | SYSTOLIC BLOOD PRESSURE: 101 MMHG | DIASTOLIC BLOOD PRESSURE: 70 MMHG | HEIGHT: 64 IN | RESPIRATION RATE: 18 BRPM | BODY MASS INDEX: 20.59 KG/M2

## 2022-11-02 DIAGNOSIS — Q62.39 UPJ OBSTRUCTION, CONGENITAL: Primary | ICD-10-CM

## 2022-11-02 PROCEDURE — 99214 OFFICE O/P EST MOD 30 MIN: CPT | Mod: S$PBB,,, | Performed by: STUDENT IN AN ORGANIZED HEALTH CARE EDUCATION/TRAINING PROGRAM

## 2022-11-02 PROCEDURE — 99213 OFFICE O/P EST LOW 20 MIN: CPT | Mod: PBBFAC,PN | Performed by: STUDENT IN AN ORGANIZED HEALTH CARE EDUCATION/TRAINING PROGRAM

## 2022-11-02 PROCEDURE — 99999 PR PBB SHADOW E&M-EST. PATIENT-LVL III: CPT | Mod: PBBFAC,,, | Performed by: STUDENT IN AN ORGANIZED HEALTH CARE EDUCATION/TRAINING PROGRAM

## 2022-11-02 PROCEDURE — 99999 PR PBB SHADOW E&M-EST. PATIENT-LVL III: ICD-10-PCS | Mod: PBBFAC,,, | Performed by: STUDENT IN AN ORGANIZED HEALTH CARE EDUCATION/TRAINING PROGRAM

## 2022-11-02 PROCEDURE — 99214 PR OFFICE/OUTPT VISIT, EST, LEVL IV, 30-39 MIN: ICD-10-PCS | Mod: S$PBB,,, | Performed by: STUDENT IN AN ORGANIZED HEALTH CARE EDUCATION/TRAINING PROGRAM

## 2024-01-25 PROBLEM — N12 PYELONEPHRITIS: Status: RESOLVED | Noted: 2017-07-09 | Resolved: 2024-01-25

## 2024-01-26 ENCOUNTER — PATIENT MESSAGE (OUTPATIENT)
Dept: UROLOGY | Facility: CLINIC | Age: 39
End: 2024-01-26
Payer: COMMERCIAL

## 2024-01-26 NOTE — TELEPHONE ENCOUNTER
That kidney function is normal. If she has further questions needs to schedule follow up as she hasn't been seen in over a year.

## 2024-02-01 NOTE — PROGRESS NOTES
[facility] Note    PCP: Migdalia Zimmer DO    Chief Complaint: right UPJ obstruction    SUBJECTIVE:       History of Present Illness:  Karie Candelario is a 38 y.o. female who presents to clinic for right UPJ obstruction. She is Established  to our clinic.   S/p robotic right pyeloplasty on 5/5/22.    No recent imaging.   GFR 1/25/24: 69    She is doing very well. No pain. No infections.   No hematuria.     11/2/22  Renal US: chronic dilation of right kidney  MAG 3: 12% function of right kidney, unable to assess drainage    She does have occasional right sided back pain, improves with movement. Sometimes radiates to the front, but is relieved with a bowel movement. Only has a BM every 3-4 days.   Drinks lots of fluids throughout the day.   No voiding complaints.     4/19/22  Patient presents to discuss right UPJo. She recently had a stent placed by Dr. Noyola for uncontrolled pain.   On review of her CT scan, she has severe right hydronephrosis with severe thinning of the right renal parenchyma.   She did have a MAG3 renal scan done in 2017 which showed 45% function of right kidney and no evidence of obstruction.     Had a stent 5 years ago which was removed and no further intervention after that as her MAG 3 was normal.     States she has had continued pain that is not relieved by the stent placement.   No issues with voiding.     UA today: small blood, trace leuks     Last urine culture: E coli (7/9/17)  Last creatinine: 1.15 (3/9/22)    Past medical, family, and social history reviewed as documented in chart with pertinent positive medical, family, and social history detailed in HPI.    Review of patient's allergies indicates:   Allergen Reactions    Codeine     Opioids - morphine analogues     Zithromax [azithromycin]        Past Medical History:   Diagnosis Date    Kidney stones     UPJ obstruction, acquired      Past Surgical History:   Procedure Laterality Date    CYSTOSCOPY       "CYSTOSCOPY W/ URETERAL STENT REMOVAL Right 6/21/2022    Procedure: CYSTOSCOPY, WITH URETERAL STENT REMOVAL;  Surgeon: Jacquelin Coughlin MD;  Location: [facility] OR;  Service: Urology;  Laterality: Right;    CYSTOURETEROSCOPY WITH RETROGRADE PYELOGRAPHY AND INSERTION OF STENT INTO URETER Right 03/10/2022    Procedure: CYSTOURETEROSCOPY, WITH RETROGRADE PYELOGRAM AND URETERAL STENT INSERTION;  Surgeon: Ousmane Noyola MD;  Location: [facility] OR;  Service: Urology;  Laterality: Right;    LITHOTRIPSY      ROBOT-ASSISTED LAPAROSCOPIC PYELOPLASTY Right 5/5/2022    Procedure: ROBOTIC PYELOPLASTY;  Surgeon: Jacquelin Coughlin MD;  Location: [facility] OR;  Service: Urology;  Laterality: Right;     No family history on file.  Social History     Tobacco Use    Smoking status: Former     Types: Cigarettes    Smokeless tobacco: Never   Substance Use Topics    Alcohol use: No    Drug use: No        Review of Systems    OBJECTIVE:     Anticoagulation: no    Estimated body mass index is 18.89 kg/m² as calculated from the following:    Height as of 1/25/24: 5' 7" (1.702 m).    Weight as of 1/25/24: 54.7 kg (120 lb 9.6 oz).    Vital Signs (Most Recent)       Physical Exam  Vitals reviewed.   Constitutional:       General: She is not in acute distress.     Appearance: Normal appearance. She is not ill-appearing.   HENT:      Head: Normocephalic and atraumatic.   Eyes:      General: No scleral icterus.  Cardiovascular:      Rate and Rhythm: Normal rate and regular rhythm.   Pulmonary:      Effort: Pulmonary effort is normal. No respiratory distress.   Abdominal:      Palpations: Abdomen is soft.   Skin:     Coloration: Skin is not jaundiced.   Neurological:      General: No focal deficit present.      Mental Status: She is alert and oriented to person, place, and time.   Psychiatric:         Mood and Affect: Mood normal.         Behavior: Behavior normal.         BMP  Lab Results   Component Value Date     01/25/2024    K 3.9 01/25/2024    "  01/25/2024    CO2 27 01/25/2024    BUN 13 01/25/2024    CREATININE 1.06 (H) 01/25/2024    CALCIUM 9.7 01/25/2024    ANIONGAP 7 (L) 05/06/2022    ESTGFRAFRICA >60 05/06/2022    EGFRNONAA 58 (A) 05/06/2022       Lab Results   Component Value Date    WBC 3.5 (L) 01/25/2024    HGB 12.3 01/25/2024    HCT 37.3 01/25/2024    MCV 87.8 01/25/2024     01/25/2024       Imaging:  Per HPI    ASSESSMENT     1. UPJ obstruction, congenital      PLAN:     - MAG 3 renal scan and renal US ordered   - Discussed that even if kidney function has declined as long as she is not having issues with pain or infections then can be monitored  - If issues develop then would need a nephrectomy at that point       Jacquelin Coughlin MD

## 2024-02-05 ENCOUNTER — OFFICE VISIT (OUTPATIENT)
Dept: UROLOGY | Facility: CLINIC | Age: 39
End: 2024-02-05
Payer: COMMERCIAL

## 2024-02-05 DIAGNOSIS — N13.30 HYDRONEPHROSIS, UNSPECIFIED HYDRONEPHROSIS TYPE: ICD-10-CM

## 2024-02-05 DIAGNOSIS — Q62.39 UPJ OBSTRUCTION, CONGENITAL: Primary | ICD-10-CM

## 2024-02-05 PROCEDURE — 99214 OFFICE O/P EST MOD 30 MIN: CPT | Mod: S$GLB,,, | Performed by: STUDENT IN AN ORGANIZED HEALTH CARE EDUCATION/TRAINING PROGRAM

## 2024-02-05 PROCEDURE — 99999 PR PBB SHADOW E&M-EST. PATIENT-LVL III: CPT | Mod: PBBFAC,,, | Performed by: STUDENT IN AN ORGANIZED HEALTH CARE EDUCATION/TRAINING PROGRAM

## 2024-02-13 ENCOUNTER — PATIENT MESSAGE (OUTPATIENT)
Dept: UROLOGY | Facility: CLINIC | Age: 39
End: 2024-02-13
Payer: COMMERCIAL

## 2024-06-20 PROBLEM — F41.1 GAD (GENERALIZED ANXIETY DISORDER): Status: ACTIVE | Noted: 2024-06-20

## 2025-01-07 ENCOUNTER — PATIENT MESSAGE (OUTPATIENT)
Dept: UROLOGY | Facility: CLINIC | Age: 40
End: 2025-01-07

## 2025-05-02 ENCOUNTER — PATIENT MESSAGE (OUTPATIENT)
Dept: UROLOGY | Facility: CLINIC | Age: 40
End: 2025-05-02
Payer: COMMERCIAL

## 2025-07-01 ENCOUNTER — PATIENT MESSAGE (OUTPATIENT)
Dept: UROLOGY | Facility: CLINIC | Age: 40
End: 2025-07-01
Payer: COMMERCIAL

## 2025-07-01 DIAGNOSIS — N13.30 HYDRONEPHROSIS, UNSPECIFIED HYDRONEPHROSIS TYPE: Primary | ICD-10-CM

## 2025-08-08 ENCOUNTER — PATIENT MESSAGE (OUTPATIENT)
Dept: UROLOGY | Facility: CLINIC | Age: 40
End: 2025-08-08
Payer: COMMERCIAL

## 2025-08-11 ENCOUNTER — OFFICE VISIT (OUTPATIENT)
Dept: UROLOGY | Facility: CLINIC | Age: 40
End: 2025-08-11
Payer: COMMERCIAL

## 2025-08-11 VITALS — BODY MASS INDEX: 18.01 KG/M2 | HEIGHT: 67 IN

## 2025-08-11 DIAGNOSIS — Q62.39 UPJ OBSTRUCTION, CONGENITAL: ICD-10-CM

## 2025-08-11 DIAGNOSIS — N13.30 HYDRONEPHROSIS, UNSPECIFIED HYDRONEPHROSIS TYPE: Primary | ICD-10-CM

## 2025-08-11 PROCEDURE — 99999 PR PBB SHADOW E&M-EST. PATIENT-LVL III: CPT | Mod: PBBFAC,,, | Performed by: STUDENT IN AN ORGANIZED HEALTH CARE EDUCATION/TRAINING PROGRAM

## 2025-08-11 PROCEDURE — 99214 OFFICE O/P EST MOD 30 MIN: CPT | Mod: S$GLB,,, | Performed by: STUDENT IN AN ORGANIZED HEALTH CARE EDUCATION/TRAINING PROGRAM

## 2025-08-11 RX ORDER — TAMSULOSIN HYDROCHLORIDE 0.4 MG/1
0.4 CAPSULE ORAL DAILY PRN
Qty: 30 CAPSULE | Refills: 5 | Status: SHIPPED | OUTPATIENT
Start: 2025-08-11

## 2025-08-11 RX ORDER — KETOROLAC TROMETHAMINE 10 MG/1
10 TABLET, FILM COATED ORAL EVERY 6 HOURS PRN
Qty: 30 TABLET | Refills: 5 | Status: SHIPPED | OUTPATIENT
Start: 2025-08-11 | End: 2025-08-16

## (undated) DEVICE — DRESSING TRANS 4X4 TEGADERM

## (undated) DEVICE — SPONGE IV DRAIN 4X4 STERILE

## (undated) DEVICE — TRAY DRY SPONGE SCRUB W FOAM

## (undated) DEVICE — SEE MEDLINE ITEM 157117

## (undated) DEVICE — SOL IRR NACL .9% 3000ML

## (undated) DEVICE — DRAPE ARM DAVINCI XI

## (undated) DEVICE — CATH IV INTROCAN 14G X 2.

## (undated) DEVICE — SUT ETHILON 2-0 BLK PS-2

## (undated) DEVICE — TOWEL OR XRAY WHITE 17X26IN

## (undated) DEVICE — DRAPE COLUMN DAVINCI XI

## (undated) DEVICE — LOOP VESSEL YELLOW MAXI

## (undated) DEVICE — SEE MEDLINE ITEM 157116

## (undated) DEVICE — NDL PNEUMOPERITONEUM 150MM

## (undated) DEVICE — SEE MEDLINE ITEM 157185

## (undated) DEVICE — Device

## (undated) DEVICE — GLOVE SURGEONS ULTRA TOUCH 6.5

## (undated) DEVICE — SUT ABS CLIP LAPRA-TY CTD

## (undated) DEVICE — TROCAR ENDOPATH XCEL 5X100MM

## (undated) DEVICE — SUT CTD VICRYL 0 UND BR

## (undated) DEVICE — LINER SUCTION 3000CC

## (undated) DEVICE — SUT MONOCRYL 3-0 UNDYED RB1

## (undated) DEVICE — SLEEVE SCD EXPRESS KNEE MEDIUM

## (undated) DEVICE — GLOVE BIOGEL PIMICRO INDIC 6.5

## (undated) DEVICE — SUT MONOCRYL 4-0 PS-2

## (undated) DEVICE — SET CYSTO IRRIGATION UNIV SPIK

## (undated) DEVICE — SUT PDS II 0 CT-1 VIL MONO

## (undated) DEVICE — DRAPE ABDOMINAL TIBURON 14X11

## (undated) DEVICE — COVER TIP CURVED SCISSORS XI

## (undated) DEVICE — GAUZE SPONGE 4X4 12PLY

## (undated) DEVICE — OBTURATOR BLADELESS 8MM XI CLR

## (undated) DEVICE — SYR 10CC LUER LOCK

## (undated) DEVICE — TROCAR ENDOPATH XCEL 12X100MM

## (undated) DEVICE — TOWEL OR DISP STRL BLUE 4/PK

## (undated) DEVICE — ELECTRODE REM PLYHSV RETURN 9

## (undated) DEVICE — SUT EASE CROSSBOW CLSR SYS

## (undated) DEVICE — IRRIGATOR SUCTION W/TIP

## (undated) DEVICE — SCRUB HIBICLENS 4% CHG 4OZ

## (undated) DEVICE — DRAIN SINGLE ROUND 1/8 10F

## (undated) DEVICE — NDL SAFETY 21G X 1 1/2 ECLPSE

## (undated) DEVICE — GLOVE SURG ULTRA TOUCH 7.5

## (undated) DEVICE — ADHESIVE DERMABOND ADVANCED

## (undated) DEVICE — EVACUATOR WOUND BULB 100CC

## (undated) DEVICE — SET TUBE PNEUMOCLEAR SE HI FLO

## (undated) DEVICE — PACK CUSTOM UNIV BASIN SLI

## (undated) DEVICE — SENSOR DUAL FLEX STR 150CM

## (undated) DEVICE — SCISSOR 5MMX35CM DIRECT DRIVE

## (undated) DEVICE — SOL ELECTROLUBE ANTI-STIC

## (undated) DEVICE — SUT PROLENE 4-0 RB-1 BL MO

## (undated) DEVICE — SOL CLEARIFY VISUALIZATION LAP

## (undated) DEVICE — CLIP HEMO-LOK MLX LARGE LF

## (undated) DEVICE — SUT MONOCRYL 3-0 RB1

## (undated) DEVICE — SEE MEDLINE ITEM 146292

## (undated) DEVICE — SCRUB DYNA-HEX LIQ 4% CHG 4OZ

## (undated) DEVICE — BLADE SURG CARBON STEEL SZ11

## (undated) DEVICE — UNDERGLOVE BIOGEL PI SZ 6.5 LF

## (undated) DEVICE — APPLICATOR CHLORAPREP ORN 26ML

## (undated) DEVICE — JELLY SURGILUBE LUBE TUBE 2OZ

## (undated) DEVICE — SEAL UNIVERSAL 5MM-8MM XI